# Patient Record
Sex: MALE | Race: AMERICAN INDIAN OR ALASKA NATIVE
[De-identification: names, ages, dates, MRNs, and addresses within clinical notes are randomized per-mention and may not be internally consistent; named-entity substitution may affect disease eponyms.]

---

## 2017-12-10 ENCOUNTER — HOSPITAL ENCOUNTER (INPATIENT)
Dept: HOSPITAL 5 - ED | Age: 77
LOS: 8 days | Discharge: ANOTHER HOSPITAL | DRG: 291 | End: 2017-12-18
Attending: INTERNAL MEDICINE | Admitting: INTERNAL MEDICINE
Payer: MEDICARE

## 2017-12-10 DIAGNOSIS — Z72.89: ICD-10-CM

## 2017-12-10 DIAGNOSIS — F17.200: ICD-10-CM

## 2017-12-10 DIAGNOSIS — I44.7: ICD-10-CM

## 2017-12-10 DIAGNOSIS — Z82.49: ICD-10-CM

## 2017-12-10 DIAGNOSIS — I25.10: ICD-10-CM

## 2017-12-10 DIAGNOSIS — R60.0: ICD-10-CM

## 2017-12-10 DIAGNOSIS — I42.9: ICD-10-CM

## 2017-12-10 DIAGNOSIS — N17.0: ICD-10-CM

## 2017-12-10 DIAGNOSIS — I95.2: ICD-10-CM
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
## 2017-12-10 DIAGNOSIS — M19.90: ICD-10-CM

## 2017-12-10 DIAGNOSIS — I11.0: Primary | ICD-10-CM

## 2017-12-10 DIAGNOSIS — Z23: ICD-10-CM
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
## 2017-12-10 DIAGNOSIS — T44.5X5A: ICD-10-CM

## 2017-12-10 DIAGNOSIS — I47.1: ICD-10-CM

## 2017-12-10 DIAGNOSIS — S81.809A: ICD-10-CM
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
## 2017-12-10 DIAGNOSIS — I50.43: ICD-10-CM
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
## 2017-12-10 DIAGNOSIS — E87.5: ICD-10-CM

## 2017-12-10 LAB
ALBUMIN SERPL-MCNC: 3.1 G/DL (ref 3.9–5)
ALBUMIN/GLOB SERPL: 0.8 %
ALP SERPL-CCNC: 64 UNITS/L (ref 35–129)
ALT SERPL-CCNC: 24 UNITS/L (ref 7–56)
ANION GAP SERPL CALC-SCNC: 22 MMOL/L
APTT BLD: 32 SEC. (ref 24.2–36.6)
BASOPHILS NFR BLD AUTO: 0.7 % (ref 0–1.8)
BILIRUB DIRECT SERPL-MCNC: 0.4 MG/DL (ref 0–0.2)
BILIRUB INDIRECT SERPL-MCNC: 1.3 MG/DL
BILIRUB SERPL-MCNC: 1.7 MG/DL (ref 0.1–1.2)
BUN SERPL-MCNC: 26 MG/DL (ref 9–20)
BUN/CREAT SERPL: 20 %
CALCIUM SERPL-MCNC: 9.3 MG/DL (ref 8.4–10.2)
CHLORIDE SERPL-SCNC: 103.8 MMOL/L (ref 98–107)
CHOLEST SERPL-MCNC: 154 MG/DL (ref 50–199)
CO2 SERPL-SCNC: 21 MMOL/L (ref 22–30)
EOSINOPHIL NFR BLD AUTO: 0.1 % (ref 0–4.3)
GLUCOSE SERPL-MCNC: 114 MG/DL (ref 75–100)
HCT VFR BLD CALC: 42.2 % (ref 35.5–45.6)
HDLC SERPL-MCNC: 49 MG/DL (ref 40–59)
HGB BLD-MCNC: 14.2 GM/DL (ref 11.8–15.2)
INR PPP: 1.3 (ref 0.87–1.13)
MAGNESIUM SERPL-MCNC: 2.1 MG/DL (ref 1.7–2.3)
MCH RBC QN AUTO: 34 PG (ref 28–32)
MCHC RBC AUTO-ENTMCNC: 34 % (ref 32–34)
MCV RBC AUTO: 100 FL (ref 84–94)
PLATELET # BLD: 150 K/MM3 (ref 140–440)
POTASSIUM SERPL-SCNC: 5.1 MMOL/L (ref 3.6–5)
PROT SERPL-MCNC: 7 G/DL (ref 6.3–8.2)
RBC # BLD AUTO: 4.23 M/MM3 (ref 3.65–5.03)
SODIUM SERPL-SCNC: 142 MMOL/L (ref 137–145)
TRIGL SERPL-MCNC: 91 MG/DL (ref 2–149)
WBC # BLD AUTO: 8.9 K/MM3 (ref 4.5–11)

## 2017-12-10 PROCEDURE — 84484 ASSAY OF TROPONIN QUANT: CPT

## 2017-12-10 PROCEDURE — 85610 PROTHROMBIN TIME: CPT

## 2017-12-10 PROCEDURE — 86850 RBC ANTIBODY SCREEN: CPT

## 2017-12-10 PROCEDURE — 94760 N-INVAS EAR/PLS OXIMETRY 1: CPT

## 2017-12-10 PROCEDURE — G0008 ADMIN INFLUENZA VIRUS VAC: HCPCS

## 2017-12-10 PROCEDURE — 85018 HEMOGLOBIN: CPT

## 2017-12-10 PROCEDURE — 86901 BLOOD TYPING SEROLOGIC RH(D): CPT

## 2017-12-10 PROCEDURE — 80074 ACUTE HEPATITIS PANEL: CPT

## 2017-12-10 PROCEDURE — 90686 IIV4 VACC NO PRSV 0.5 ML IM: CPT

## 2017-12-10 PROCEDURE — 85730 THROMBOPLASTIN TIME PARTIAL: CPT

## 2017-12-10 PROCEDURE — 99285 EMERGENCY DEPT VISIT HI MDM: CPT

## 2017-12-10 PROCEDURE — 83735 ASSAY OF MAGNESIUM: CPT

## 2017-12-10 PROCEDURE — 96375 TX/PRO/DX INJ NEW DRUG ADDON: CPT

## 2017-12-10 PROCEDURE — 82553 CREATINE MB FRACTION: CPT

## 2017-12-10 PROCEDURE — A9502 TC99M TETROFOSMIN: HCPCS

## 2017-12-10 PROCEDURE — 93010 ELECTROCARDIOGRAM REPORT: CPT

## 2017-12-10 PROCEDURE — 80048 BASIC METABOLIC PNL TOTAL CA: CPT

## 2017-12-10 PROCEDURE — 93005 ELECTROCARDIOGRAM TRACING: CPT

## 2017-12-10 PROCEDURE — 93970 EXTREMITY STUDY: CPT

## 2017-12-10 PROCEDURE — 71020: CPT

## 2017-12-10 PROCEDURE — 85025 COMPLETE CBC W/AUTO DIFF WBC: CPT

## 2017-12-10 PROCEDURE — 90471 IMMUNIZATION ADMIN: CPT

## 2017-12-10 PROCEDURE — 93306 TTE W/DOPPLER COMPLETE: CPT

## 2017-12-10 PROCEDURE — 36415 COLL VENOUS BLD VENIPUNCTURE: CPT

## 2017-12-10 PROCEDURE — 81001 URINALYSIS AUTO W/SCOPE: CPT

## 2017-12-10 PROCEDURE — 85027 COMPLETE CBC AUTOMATED: CPT

## 2017-12-10 PROCEDURE — 83880 ASSAY OF NATRIURETIC PEPTIDE: CPT

## 2017-12-10 PROCEDURE — G0009 ADMIN PNEUMOCOCCAL VACCINE: HCPCS

## 2017-12-10 PROCEDURE — 82962 GLUCOSE BLOOD TEST: CPT

## 2017-12-10 PROCEDURE — 85014 HEMATOCRIT: CPT

## 2017-12-10 PROCEDURE — 85520 HEPARIN ASSAY: CPT

## 2017-12-10 PROCEDURE — 82550 ASSAY OF CK (CPK): CPT

## 2017-12-10 PROCEDURE — 93017 CV STRESS TEST TRACING ONLY: CPT

## 2017-12-10 PROCEDURE — 96374 THER/PROPH/DIAG INJ IV PUSH: CPT

## 2017-12-10 PROCEDURE — 78452 HT MUSCLE IMAGE SPECT MULT: CPT

## 2017-12-10 PROCEDURE — 80061 LIPID PANEL: CPT

## 2017-12-10 PROCEDURE — 85049 AUTOMATED PLATELET COUNT: CPT

## 2017-12-10 PROCEDURE — 90732 PPSV23 VACC 2 YRS+ SUBQ/IM: CPT

## 2017-12-10 PROCEDURE — 86900 BLOOD TYPING SEROLOGIC ABO: CPT

## 2017-12-10 NOTE — EMERGENCY DEPARTMENT REPORT
ED Shortness of Breath HPI





- General


Chief Complaint: Dyspnea/Respdistress


Stated Complaint: CP/SOB


Time Seen by Provider: 12/10/17 21:21


Source: patient


Mode of arrival: Wheelchair


Limitations: Physical Limitation





- History of Present Illness


Initial Comments: 


Jannet is a 77-year-old male presents to the emergency room with chest pain or 

shortness of breath 2 months.  Patient also states also had bilateral lower 

extremity edema for one week.  Patient denies fever or chills or any other 

pain.  Patient denies past medical history.  Patient states he is not on any 

medications.  patient denies calf pain. 





MD Complaint: shortness of breath, chest pain


-: Gradual (over 2 months)


Pain Scale: 5


Quality: dull, aching


Consistency: constant


Improves With: rest


Worsens With: lying flat, exertion


Associated Symptoms: chest pain, orhopnia


Treatments Prior to Arrival: none





- Related Data


Home Oxygen Therapy: No


 Allergies











Allergy/AdvReac Type Severity Reaction Status Date / Time


 


No Known Allergies Allergy   Verified 12/10/17 22:34














ED Review of Systems


ROS: 


Stated complaint: CP/SOB


Other details as noted in HPI





Comment: All other systems reviewed and negative


Constitutional: weakness


Respiratory: orthopnea, shortness of breath, SOB with exertion, SOB at rest


Cardiovascular: chest pain, dyspnea on exertion


Endocrine: no symptoms reported


Gastrointestinal: as per HPI


Skin: other (bilateral lower extremity edema)





ED Past Medical Hx





- Past Medical History


Previous Medical History?: Yes


Hx Arthritis: Yes





- Surgical History


Past Surgical History?: No





- Family History


Family history: hypertension





- Social History


Smoking Status: Former Smoker


Substance Use Type: Alcohol





ED Physical Exam





- General


Limitations: Physical Limitation


General appearance: alert, in no apparent distress





- Head


Head exam: Present: atraumatic, normocephalic





- Eye


Eye exam: Present: normal appearance





- ENT


ENT exam: Present: mucous membranes moist





- Neck


Neck exam: Present: normal inspection





- Respiratory


Respiratory exam: Present: normal lung sounds bilaterally.  Absent: respiratory 

distress





- Cardiovascular


Cardiovascular Exam: Present: regular rate, normal rhythm.  Absent: systolic 

murmur, diastolic murmur, rubs, gallop





- GI/Abdominal


GI/Abdominal exam: Present: soft, normal bowel sounds





- Rectal


Rectal exam: Present: deferred





- Extremities Exam


Extremities exam: Present: other (bilateral lower extremity swelling noted 

above the knee.  No calf tenderness noticed)





- Back Exam


Back exam: Present: normal inspection





- Neurological Exam


Neurological exam: Present: alert, oriented X3





- Psychiatric


Psychiatric exam: Present: normal affect, normal mood





- Skin


Skin exam: Present: warm, dry, intact, normal color.  Absent: rash





ED Course


 Vital Signs











  12/10/17





  12:01


 


Temperature 98.3 F


 


Pulse Rate 114 H


 


Respiratory 24





Rate 


 


Blood Pressure 117/87


 


O2 Sat by Pulse 95





Oximetry 














ED Medical Decision Making





- Lab Data


Result diagrams: 


 12/10/17 12:21





 12/10/17 12:21





- EKG Data


-: EKG Interpreted by Me


EKG shows normal: sinus rhythm


Rate: tachycardia





- EKG Data


When compared to previous EKG there are: previous EKG unavailable


Interpretation: no acute changes, other (left bundle branch block noted)





- Radiology Data


Radiology results: report reviewed


Findings consistent with CHF








- Medical Decision Making


 Hospitalist consulted for admission.  Discussed case with hospitalist. 

Hospitalist to admit.  New-onset CHF.  Cardiology, Dr. Carvajal consulted.  

Discussed case with Dr. Carvajal.  Dr. Carvajal is admit patient for CHF exacerbation.  

lasix to be given. 








- Differential Diagnosis


cp. sob. new chf


Critical care attestation.: 


If time is entered above; I have spent that time in minutes in the direct care 

of this critically ill patient, excluding procedure time.








ED Disposition


Clinical Impression: 


 Chest pain, Shortness of breath, CHF exacerbation, Bilateral lower extremity 

edema, Hyperkalemia





Disposition: DC-09 OP ADMIT IP TO THIS HOSP


Is pt being admited?: Yes


Does the pt Need Aspirin: Yes


Condition: Critical


Time of Disposition: 22:10

## 2017-12-10 NOTE — XRAY REPORT
ROUTINE CHEST, TWO VIEWS:



HISTORY:  chest pain.



Moderate to severe cardiomegaly, mild vascular congestion and small 

bilateral pleural effusions are identified. Focal atelectasis or 

scarring in the right perihilar region is suspected. No convincing 

pneumonia. No pneumothorax.



IMPRESSION:

CHF.

## 2017-12-11 LAB
APTT BLD: 33.8 SEC. (ref 24.2–36.6)
BILIRUB UR QL STRIP: (no result)
BLOOD UR QL VISUAL: (no result)
CK SERPL-CCNC: 174 UNITS/L (ref 55–170)
CK SERPL-CCNC: 188 UNITS/L (ref 55–170)
HCT VFR BLD CALC: 42.5 % (ref 35.5–45.6)
HGB BLD-MCNC: 13.6 GM/DL (ref 11.8–15.2)
INR PPP: 1.27 (ref 0.87–1.13)
KETONES UR STRIP-MCNC: (no result) MG/DL
LEUKOCYTE ESTERASE UR QL STRIP: (no result)
MUCOUS THREADS #/AREA URNS HPF: (no result) /HPF
NITRITE UR QL STRIP: (no result)
PH UR STRIP: 5 [PH] (ref 5–7)
PLATELET # BLD: 139 K/MM3 (ref 140–440)
PROT UR STRIP-MCNC: (no result) MG/DL
RBC #/AREA URNS HPF: < 1 /HPF (ref 0–6)
UROBILINOGEN UR-MCNC: < 2 MG/DL (ref ?–2)
WBC #/AREA URNS HPF: 1 /HPF (ref 0–6)

## 2017-12-11 PROCEDURE — 3E0234Z INTRODUCTION OF SERUM, TOXOID AND VACCINE INTO MUSCLE, PERCUTANEOUS APPROACH: ICD-10-PCS | Performed by: INTERNAL MEDICINE

## 2017-12-11 RX ADMIN — HEPARIN SODIUM SCH MLS/HR: 5000 INJECTION, SOLUTION INTRAVENOUS at 14:05

## 2017-12-11 RX ADMIN — NITROGLYCERIN SCH INCH: 20 OINTMENT TOPICAL at 09:59

## 2017-12-11 RX ADMIN — NITROGLYCERIN SCH INCH: 20 OINTMENT TOPICAL at 05:18

## 2017-12-11 RX ADMIN — NITROGLYCERIN SCH: 20 OINTMENT TOPICAL at 14:18

## 2017-12-11 RX ADMIN — CARVEDILOL SCH: 6.25 TABLET, FILM COATED ORAL at 21:50

## 2017-12-11 RX ADMIN — HEPARIN SODIUM SCH MLS/HR: 5000 INJECTION, SOLUTION INTRAVENOUS at 05:30

## 2017-12-11 RX ADMIN — FUROSEMIDE SCH MG: 10 INJECTION, SOLUTION INTRAVENOUS at 09:58

## 2017-12-11 RX ADMIN — ASPIRIN SCH MG: 81 TABLET, CHEWABLE ORAL at 09:58

## 2017-12-11 RX ADMIN — NITROGLYCERIN SCH: 20 OINTMENT TOPICAL at 17:24

## 2017-12-11 RX ADMIN — CARVEDILOL SCH MG: 6.25 TABLET, FILM COATED ORAL at 09:59

## 2017-12-11 RX ADMIN — NITROGLYCERIN SCH: 20 OINTMENT TOPICAL at 10:08

## 2017-12-11 RX ADMIN — NITROGLYCERIN SCH: 20 OINTMENT TOPICAL at 12:11

## 2017-12-11 NOTE — HISTORY AND PHYSICAL REPORT
CHIEF COMPLAINT:  Shortness of breath.



OTHER COMPLAINT:  Increased swelling of the lower extremities.



HISTORY OF PRESENTING ILLNESS:  The patient is a 77-year-old male who says he

has been having shortness of breath, going on and off for about two months and

has noted swelling in both lower extremities, going on for about one week and

becoming progressively big in size.  The patient also complained of some chest

discomfort and cough.  There is no history of fever or chills and no history of

nausea or vomiting.  The patient also denied history of pain in the lower

extremities.



PAST MEDICAL HISTORY:  Pertinent for high blood pressure.  Also, the patient has

past history of nephritis.



PAST SURGICAL HISTORY:  Unremarkable.



FAMILY HISTORY:  Pertinent for hypertension.



SOCIAL HISTORY:  The patient drinks alcohol.  He used to smoke before, but does

not smoke any more and does not use illicit drug.



MEDICATIONS:  The patient is not on any home medications.



ALLERGIES:  There are no known drug allergies.



REVIEW OF SYSTEMS:

CONSTITUTIONAL:  There is no fever, no chills, no diaphoresis.

HEENT:  There is no headache or sore throat.

CARDIOVASCULAR SYSTEM:  Chest discomfort present.  No orthopnea.

RESPIRATORY SYSTEM:  Shortness of breath present.  Cough present.

GASTROINTESTINAL SYSTEM:  There is no nausea, no vomiting; no abdominal pain,

diarrhea or constipation.

NEUROLOGICAL SYSTEM:  There is no numbness, no dizziness, no altered mental

status.

MUSCULOSKELETAL SYSTEM:  There is swelling of both lower extremities and no

joint pain.

DERMATOLOGICAL SYSTEM:  There is no skin rash or itching.

GENITOURINARY SYSTEM:  There are no dysuria, hematuria or flank pain.

Rest of system review is normal.



PHYSICAL EXAMINATION:

GENERAL:  At the time of exam, the patient was found to be alert, oriented x 3

and in mild distress due to shortness of breath and swelling in the lower

extremities.

VITAL SIGNS:  Shows normal temperature with pulse of 100, respirations around

18-25, blood pressure 112/65 with O2 sat of about 86% before oxygen

administration and up to 100% with oxygen by nasal cannula.

HEENT:  Showed pupils to be equal, round, and reactive to light and

accommodation.  Extraocular muscles are intact.

NECK:  Supple with JVD and no carotid bruit.

CARDIOVASCULAR SYSTEM:  Showed normal first and second heart sounds with no

gallops or murmurs.

RESPIRATORY SYSTEM:  Showed reduced air entry on both sides of the lung with

bibasilar rales.

GASTROINTESTINAL SYSTEM:  Show abdomen to be full, soft, nontender with no

organomegaly or rigidity.

NEUROLOGIC:  Shows no focal deficit.

MUSCULOSKELETAL SYSTEM:  Shows swelling of the lower extremities from the foot

up to the thigh level, which is pitting in nature.

DERMATOLOGICAL SYSTEM:  Show no skin rash.

GENITOURINARY SYSTEM:  Show no costovertebral angle tenderness.



PERTINENT LABORATORY AND IMAGING STUDIES:  The patient has CBC done that shows

normal white count, normal hemoglobin and normal hematocrit with elevated MCV of

100.  CBC differential showing elevated segmented neutrophil of 80.4. 

Coagulation studies show slight increase in PT of 16.9 and slight increase in

INR of 1.30.  Chemistry shows a slightly elevated potassium level of 5.1 with

high BUN of 26 and slightly low GFR of 54.  The patient's total bilirubin level

was high with a value of 1.7 and direct bilirubin is slightly elevated with a

value of 0.4.  The patient's total creatinine kinase is high with a value of 188

with elevated CK-MB of 8.0 and an elevated percentage index of 4.0.  The

patient's troponin level is high with a value of 0.030 and the second value with

a value of 0.031 and the last one of 0.035.  The patient's brain natriuretic

peptide level is high with a value of 31,233 and albumin level shows slight

decreased value of 3.1.  Urinalysis came back unremarkable.



IMAGING STUDIES:  The patient had a chest x-ray done that shows CHF.



DIAGNOSES:

1.  Congestive heart failure.

2.  Non-ST segment elevation myocardial infarction.



PLAN:  The patient will be admitted to medical floor on telemetry and will have

echocardiogram done in the morning.  The patient will have Cardiology consult

with Dr. Rodriguez in view of the elevated troponin levels and will be on aspirin

81 mg by mouth daily and will be on carvedilol 6.25 mg by mouth twice daily. 

The patient will be on Lasix 40 mg IV daily and will be on heparin 5000 units

subcutaneously every 12 hours for DVT prophylaxis.  The patient will be on

lisinopril 5 mg by mouth daily and morphine 2 mg IV every 5 minutes as needed

for pain and nitro paste half inch to anterior chest wall q.i.d.  The patient

will be on oxygen by nasal cannula 2 liter per minute, and in view of the

persistent elevated troponin level, the patient will be started on IV heparin

rather than subcutaneous heparin and will be n.p.o. for possible intervention by

the Cardiology.





DD: 12/11/2017 02:48

DT: 12/11/2017 04:22

JOB# [id number]  [id number]

OCN/NTS

## 2017-12-11 NOTE — CONSULTATION
History of Present Illness


Consult date: 12/11/17





Medications and Allergies


 Allergies











Allergy/AdvReac Type Severity Reaction Status Date / Time


 


No Known Allergies Allergy   Verified 12/10/17 22:34











 Home Medications











 Medication  Instructions  Recorded  Confirmed  Last Taken  Type


 


No Known Home Medications [No  12/11/17 12/11/17 Unknown History





Reported Home Medications]     











Active Meds: 


Active Medications





Aspirin (Baby Aspirin)  81 mg PO QDAY [facility]


   Last Admin: 12/11/17 09:58 Dose:  81 mg


Carvedilol (Coreg)  6.25 mg PO BID [facility]


   Last Admin: 12/11/17 09:59 Dose:  6.25 mg


Furosemide (Lasix)  40 mg IV QDAY [facility]


   Last Admin: 12/11/17 09:58 Dose:  40 mg


Heparin Sodium/Sodium Chloride (Heparin/ 0.45% Nacl-25,000 Unit/500 Ml)  25,000 

unit in 500 mls @ 20 mls/hr IV TITRATE [facility]; 1,000 UNITS/HR


   PRN Reason: Protocol


   Last Admin: 12/11/17 05:30 Dose:  1,000 units/hr, 20 mls/hr


Influenza Virus Vaccine Quadrival (Fluarix Quad [id number](36 Mos+)  0.5 ml IM 

.ONCE ONE


   Stop: 12/11/17 12:01


Lisinopril (Zestril)  5 mg PO QDAY [facility]


   Last Admin: 12/11/17 09:59 Dose:  5 mg


Morphine Sulfate (Morphine)  2 mg IV Q5MIN PRN


   PRN Reason: Chest Pain


   Last Admin: 12/10/17 23:04 Dose:  2 mg


Nitroglycerin (Nitrostat)  0.4 mg SL .Q5MIN PRN


   PRN Reason: Chest Pain


Nitroglycerin (Nitro-Bid 2%)  0.5 inch TP QIDNTG [facility]


   PRN Reason: Protocol


   Last Admin: 12/11/17 10:08 Dose:  Not Given











Physical Examination


 Vital Signs











Temp Pulse Resp BP Pulse Ox


 


 98.3 F   114 H  24   117/87   95 


 


 12/10/17 12:01  12/10/17 12:01  12/10/17 12:01  12/10/17 12:01  12/10/17 12:01














Results





 12/11/17 04:54





 12/10/17 12:21


 Cardiac Enzymes











  12/10/17 12/11/17 12/11/17 Range/Units





  12:21 01:11 04:54 


 


AST  27    (5-40)  units/L


 


CK-MB (CK-2)   8.0 H  7.9 H  (0.0-4.0)  ng/mL








 Coagulation











  12/10/17 12/11/17 Range/Units





  12:21 04:54 


 


PT  16.9 H  16.6 H  (12.2-14.9)  Sec.


 


INR  1.30 H  1.27 H  (0.87-1.13)  


 


APTT  32.0  33.8  (24.2-36.6)  Sec.








 Lipids











  12/10/17 Range/Units





  12:21 


 


Triglycerides  91  (2-149)  mg/dL


 


Cholesterol  154  ()  mg/dL


 


HDL Cholesterol  49  (40-59)  mg/dL


 


Cholesterol/HDL Ratio  3.14  %








 CBC











  12/10/17 12/11/17 Range/Units





  12:21 04:54 


 


WBC  8.9   (4.5-11.0)  K/mm3


 


RBC  4.23   (3.65-5.03)  M/mm3


 


Hgb  14.2  13.6  (11.8-15.2)  gm/dl


 


Hct  42.2  42.5  (35.5-45.6)  %


 


Plt Count  150  139 L  (140-440)  K/mm3


 


Lymph #  0.9 L   (1.2-5.4)  K/mm3


 


Mono #  0.7   (0.0-0.8)  K/mm3


 


Eos #  0.0   (0.0-0.4)  K/mm3


 


Baso #  0.1   (0.0-0.1)  K/mm3








 Comprehensive Metabolic Panel











  12/10/17 Range/Units





  12:21 


 


Sodium  142  (137-145)  mmol/L


 


Potassium  5.1 H  (3.6-5.0)  mmol/L


 


Chloride  103.8  ()  mmol/L


 


Carbon Dioxide  21 L  (22-30)  mmol/L


 


BUN  26 H  (9-20)  mg/dL


 


Creatinine  1.3  (0.8-1.5)  mg/dL


 


Glucose  114 H  ()  mg/dL


 


Calcium  9.3  (8.4-10.2)  mg/dL


 


Direct Bilirubin  0.4 H  (0-0.2)  mg/dL


 


Indirect Bilirubin  1.3  mg/dL


 


AST  27  (5-40)  units/L


 


ALT  24  (7-56)  units/L


 


Alkaline Phosphatase  64  ()  units/L


 


Total Protein  7.0  (6.3-8.2)  g/dL


 


Albumin  3.1 L  (3.9-5)  g/dL














Assessment and Plan





Detailed Cardiology consult done.

## 2017-12-11 NOTE — PROGRESS NOTE
Assessment and Plan








- Chest pain


 Monitor serial Francisco. continue with oxygen, NTG, ASA, morphine. Cardiology 

consulted





- Systolic heart failure


ECHO done. EF 5-10% wiht severe systolic dysfunction. 


Strict Is and Os, Daily weight, Limited fluid intake to 1L/day


ACEI, BB, Statin, 





- KASSIE


Elevated BUN 26. Likely secondry to diuresis. Trend BUN and CR





- Hyperkalemia


anticipate decreased with diuresis. Trend





- Abnormal Francisco


on oxygen NTG, ASA,  BB, Cardilogy following





- DVT PPx with Heparinfg and GI with pepcid











Subjective


Date of service: 12/11/17


Principal diagnosis: chest pain


Interval history: 








Chest pain getting better. Still has shortness of breath. Swelling of both feet 

improving








Objective





- Constitutional


Vitals: 


 Vital Signs - 12hr











  12/11/17 12/11/17





  12:12 13:19


 


Pulse Rate 81 


 


O2 Sat by Pulse  97





Oximetry  











General appearance: Present: no acute distress





- EENT


Eyes: PERRL, EOM intact


ENT: hearing intact, clear oral mucosa





- Neck


Neck: supple, normal ROM





- Respiratory


Respiratory effort: normal


Respiratory: bilateral: diminished





- Breasts


Breasts: normal





- Cardiovascular


Rhythm: regular


Heart Sounds: Present: S1 & S2.  Absent: gallop, rub


Extremities: pulses intact, normal color, Full ROM


Extremity abnormal: edema (3+ tyesha LE)





- Gastrointestinal


General gastrointestinal: Present: soft, non-tender, non-distended, normal 

bowel sounds





- Genitourinary


Male genitourinary: normal





- Integumentary


Integumentary: clear, warm, dry





- Musculoskeletal


Musculoskeletal: 1, strength equal bilaterally





- Neurologic


Neurologic: moves all extremities





- Psychiatric


Psychiatric: memory intact, appropriate mood/affect, intact judgment & insight





- Labs


CBC & Chem 7: 


 12/11/17 04:54





 12/10/17 12:21


Labs: 


 Abnormal lab results











  12/11/17 12/11/17 12/11/17 Range/Units





  01:11 04:54 04:54 


 


Plt Count    139 L  (140-440)  K/mm3


 


PT     (12.2-14.9)  Sec.


 


INR     (0.87-1.13)  


 


Heparin Anti-Xa Level     (0.3-0.7)  U.I./ml


 


Total Creatine Kinase  188 H  174 H   ()  units/L


 


CK-MB (CK-2)  8.0 H  7.9 H   (0.0-4.0)  ng/mL


 


CK-MB (CK-2) Rel Index  4.2 H  4.5 H   (0-4)  


 


Troponin T  0.035 H    (0.00-0.029)  ng/mL














  12/11/17 12/11/17 Range/Units





  04:54 12:09 


 


Plt Count    (140-440)  K/mm3


 


PT  16.6 H   (12.2-14.9)  Sec.


 


INR  1.27 H   (0.87-1.13)  


 


Heparin Anti-Xa Level   0.26 L  (0.3-0.7)  U.I./ml


 


Total Creatine Kinase    ()  units/L


 


CK-MB (CK-2)    (0.0-4.0)  ng/mL


 


CK-MB (CK-2) Rel Index    (0-4)  


 


Troponin T    (0.00-0.029)  ng/mL

## 2017-12-12 LAB
ANION GAP SERPL CALC-SCNC: 59 MMOL/L
BUN SERPL-MCNC: 30 MG/DL (ref 9–20)
BUN/CREAT SERPL: 20 %
CALCIUM SERPL-MCNC: 8 MG/DL (ref 8.4–10.2)
CHLORIDE SERPL-SCNC: 60 MMOL/L (ref 98–107)
CO2 SERPL-SCNC: 29 MMOL/L (ref 22–30)
GLUCOSE SERPL-MCNC: 84 MG/DL (ref 75–100)
POTASSIUM SERPL-SCNC: 4.1 MMOL/L (ref 3.6–5)
SODIUM SERPL-SCNC: 144 MMOL/L (ref 137–145)

## 2017-12-12 RX ADMIN — HEPARIN SODIUM SCH UNIT: 5000 INJECTION, SOLUTION INTRAVENOUS; SUBCUTANEOUS at 21:50

## 2017-12-12 RX ADMIN — ASPIRIN SCH MG: 81 TABLET, CHEWABLE ORAL at 11:53

## 2017-12-12 RX ADMIN — CARVEDILOL SCH: 6.25 TABLET, FILM COATED ORAL at 17:18

## 2017-12-12 RX ADMIN — NITROGLYCERIN SCH: 20 OINTMENT TOPICAL at 05:24

## 2017-12-12 RX ADMIN — NITROGLYCERIN SCH: 20 OINTMENT TOPICAL at 17:19

## 2017-12-12 RX ADMIN — HEPARIN SODIUM SCH MLS/HR: 5000 INJECTION, SOLUTION INTRAVENOUS at 06:03

## 2017-12-12 RX ADMIN — FUROSEMIDE SCH: 10 INJECTION, SOLUTION INTRAVENOUS at 21:59

## 2017-12-12 RX ADMIN — NITROGLYCERIN SCH: 20 OINTMENT TOPICAL at 18:53

## 2017-12-12 RX ADMIN — CARVEDILOL SCH: 6.25 TABLET, FILM COATED ORAL at 21:49

## 2017-12-12 RX ADMIN — LISINOPRIL SCH: 5 TABLET ORAL at 17:19

## 2017-12-12 NOTE — CONSULTATION
REFERRING PHYSICIAN:  Arvind Morgan MD, hospitalist.



HISTORY OF PRESENT ILLNESS:  A 77-year-old pleasant -American gentleman

with history of longstanding hypertension, degenerative joint disease, was

admitted with progressive shortness of breath for the past 1 month duration and

also moderate-to-severe substernal pressure-like chest pain on and off, at times

addressed, and also on exertion, radiating to both sides of the neck.  It is not

associated with any nausea, vomiting, sweating, palpitations, presyncope or

syncope.  He also gives a history of swelling of both the legs and feet for the

past 1 month.



He has not seen a physician for a long time.  No history of diabetes mellitus or

hyperlipidemia.  His serum creatinine is 1.3.  His troponins are minimally

increased with the CPK of 188 and 174, with MB of 8 and indices of 4.2 and 4.5. 

The mild increase in troponin is most likely secondary to congestive heart

failure.  He has improved clinically with treatment.  Monitor also revealed an 
episode

of nonsustained wide QRS tachycardia, most likely ventricular tachycardia

(rate of 150 beats per minute).  His EKG revealed complete left bundle branch

block.  He is being treated with intravenous diuretics with improvement.



PAST MEDICAL HISTORY:  History of multiple medical problems as described above. 

No history of CAD or myocardial infarction in the past, and history of chronic

shortness of breath.



SOCIAL HISTORY:  He was a smoker, more than 10 years ago.  He smoked for about 2

years (a pack a day and quit smoking then.)  No history of alcoholic or drug

abuse.



FAMILY HISTORY:  Positive for hypertension.  Negative for premature coronary

artery disease.



ALLERGIES:  None known.



MEDICATIONS:  He received intravenous morphine, nitro paste in the emergency

room, and he also received aspirin.  He is on aspirin 325 mg.  He is on baby

aspirin 81 mg p.o. daily, Coreg 6.25 mg p.o. b.i.d., furosemide 40 mg IV daily,

lisinopril 5 mg p.o. daily, nitro paste half an inch to the chest wall.



REVIEW OF SYSTEMS:

CARDIOVASCULAR SYSTEM:  As described in the history.

BONE AND JOINTS:  As described in the history.

GI:  Total bilirubin during this admission has increased to 1.7.

Review of rest of the 10 systems is negative.



PHYSICAL EXAMINATION:

GENERAL:  A 77-year-old pleasant -American gentleman.

VITAL SIGNS:  He is afebrile, pulse is 90 per minute regular, respirations 22

per minute, blood pressure 106/74 mmHg.

NEUROLOGIC:  He is alert and oriented x 3.

HEENT:  Negative.

NECK:  Supple.  JVD 1-2+.  No carotid bruit.

HEART:  PMI shifted down and out and is heaving in nature, no palpable thrills. 

Auscultation of the heart reveals a grade 2-3/6 ejection systolic murmur over

the precardium, not contacted to the carotids more prominently over the apex. 

No rub.  S4 is present, no S3.  No rub.

EXTREMITIES:  Peripheral pulses felt.  Bilateral 1-2+ pitting edema of both legs

and feet.

LUNGS:  Examination also revealed mild wheezing.  No bronchial breathing.

ABDOMEN:  Soft, benign.  No organomegaly.

SKIN:  Negative.

BONE AND JOINTS:  Negative.



LABORATORY DATA:  Troponin 0.03, 0.031, 0.035, 0.028; HDL is 49, LDL is 87,

triglycerides 91, potassium 5.1, BUN and creatinine 26 and 1.3, sodium 142. 

Chest x-ray reveals congestive heart failure.  EKG is sinus tachycardia with

rate of 119 per minute.  Complete left bundle branch block with secondary ST-T

changes.  His proBNP is markedly increased to 31,233.



IMPRESSION:

1.  Recurrent chest pains with mild increase in troponins (there is mild

increase in troponins could be secondary to congestive heart failure and also

chronic kidney disease, acute myocardial infarction is unlikely).

2.  Acute on chronic congestive heart failure (whether it is systolic or

diastolic or a combination of both will be known after reviewing the

echocardiogram).

3.  History of hypertension.

4.  History of degenerative joint disease.

5.  Mild hyperkalemia.

6.  Possible chronic kidney disease, creatinine 1.3.

7.  Complete left bundle branch block and EKG.



RECOMMENDATIONS:

1.  We will hold lisinopril in view of mild hyperkalemia.

2.  Salt and fluid restriction.

3.  To continue other medications.

4.  We will follow up echocardiogram (pending at this time).

5.  We will schedule him for a Haley stress nuclear scan in the a.m., and need to

be followed up.



Prognosis is guarded.  We will follow.





DD: 12/11/2017 10:25

DT: 12/12/2017 01:05

JOB# [id number]  [id number]

[facility]

## 2017-12-12 NOTE — PROGRESS NOTE
Assessment and Plan


Assessment and plan: 


Patient is a 77 year old male with long standing hx of HTN, DJD, admitted with 

severe shortness of breath, ongoing for 2 months and worse in the last ffew 

days with associated lower ext swelling. Occasional Alcohol per hx and current 

tobacco use. 





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


* I/O, DAILY Weight, cardiology input noted, coreg, hold Lisinopril, Discussed 

with cardiology will need Life vest due to EF of 10%


Atypical chest pain


* Stress test negative. Likely secondary to CAD, will monitor. Now resolved, 

STATIN THERAPY


 KASSIE


* Elevated BUN 26. Likely secondry to Vasomotor nephropathy from diuresis. 

Trend BUN and CR. Hold ACEI


 Hyperkalemia


* Resolved, anticipate decreased with diuresis. Trend


Non Ischemic cardiomyopathy


* cardiology following


DJD


* stable, ambulates with a cane


complete LBBB


* Cardiology following


 Abnormal Tasha


* Elevate Troponin like secondary to cardiomyopathy


DVT PPx with Heparin and GI with pepcid





Plan discussed with patient in detail .





History


Interval history: 


Patient seen and examined in no acute distress. He still with some shortness of 

breath but reports improvement in his lower ext swelling. He denies any chest 

pain, nausea, vomiting or diarrhea. 








Hospitalist Physical





- Constitutional


Vitals: 


 











Temp Pulse Resp BP Pulse Ox


 


 98.2 F   50 L  18   93/45   7 L


 


 12/12/17 07:49  12/12/17 07:49  12/12/17 07:49  12/12/17 07:49  12/12/17 08:00











General appearance: Present: no acute distress





- EENT


Eyes: Present: PERRL, EOM intact


ENT: hearing intact, clear oral mucosa





- Neck


Neck: Present: supple, normal ROM





- Respiratory


Respiratory effort: normal


Respiratory: bilateral: rhonchi





- Cardiovascular


Rhythm: regular


Heart Sounds: Present: S1 & S2





- Extremities


Extremities: no ischemia, pulses intact, pulses symmetrical


Extremity abnormal: edema (2+ pitting edema)


Peripheral Pulses: within normal limits





- Abdominal


General gastrointestinal: soft, non-tender, non-distended





- Integumentary


Integumentary: Present: clear, warm





- Psychiatric


Psychiatric: appropriate mood/affect, intact judgment & insight





- Neurologic


Neurologic: CNII-XII intact, moves all extremities





- Allied Health


Allied health notes reviewed: nursing





Results





- Labs


CBC & Chem 7: 


 12/11/17 04:54





 12/12/17 02:11


Labs: 


 Laboratory Last Values











WBC  8.9 K/mm3 (4.5-11.0)   12/10/17  12:21    


 


RBC  4.23 M/mm3 (3.65-5.03)   12/10/17  12:21    


 


Hgb  13.6 gm/dl (11.8-15.2)   12/11/17  04:54    


 


Hct  42.5 % (35.5-45.6)   12/11/17  04:54    


 


MCV  100 fl (84-94)  H  12/10/17  12:21    


 


MCH  34 pg (28-32)  H  12/10/17  12:21    


 


MCHC  34 % (32-34)   12/10/17  12:21    


 


RDW  18.0 % (13.2-15.2)  H  12/10/17  12:21    


 


Plt Count  139 K/mm3 (140-440)  L  12/11/17  04:54    


 


Lymph % (Auto)  10.4 % (13.4-35.0)  L  12/10/17  12:21    


 


Mono % (Auto)  8.4 % (0.0-7.3)  H  12/10/17  12:21    


 


Eos % (Auto)  0.1 % (0.0-4.3)   12/10/17  12:21    


 


Baso % (Auto)  0.7 % (0.0-1.8)   12/10/17  12:21    


 


Lymph #  0.9 K/mm3 (1.2-5.4)  L  12/10/17  12:21    


 


Mono #  0.7 K/mm3 (0.0-0.8)   12/10/17  12:21    


 


Eos #  0.0 K/mm3 (0.0-0.4)   12/10/17  12:21    


 


Baso #  0.1 K/mm3 (0.0-0.1)   12/10/17  12:21    


 


Seg Neutrophils %  80.4 % (40.0-70.0)  H  12/10/17  12:21    


 


Seg Neutrophils #  7.1 K/mm3 (1.8-7.7)   12/10/17  12:21    


 


PT  16.6 Sec. (12.2-14.9)  H  12/11/17  04:54    


 


INR  1.27  (0.87-1.13)  H  12/11/17  04:54    


 


APTT  33.8 Sec. (24.2-36.6)   12/11/17  04:54    


 


Heparin Anti-Xa Level  0.55 U.I./ml (0.3-0.7)   12/12/17  01:50    


 


Sodium  144 mmol/L (137-145)   12/12/17  02:11    


 


Potassium  4.1 mmol/L (3.6-5.0)   12/12/17  02:11    


 


Chloride  60.0 mmol/L ()  L  12/12/17  02:11    


 


Carbon Dioxide  29 mmol/L (22-30)  D 12/12/17  02:11    


 


Anion Gap  59 mmol/L  12/12/17  02:11    


 


BUN  30 mg/dL (9-20)  H  12/12/17  02:11    


 


Creatinine  1.5 mg/dL (0.8-1.5)   12/12/17  02:11    


 


Estimated GFR  > 60 ml/min  12/12/17  02:11    


 


BUN/Creatinine Ratio  20 %  12/12/17  02:11    


 


Glucose  84 mg/dL ()   12/12/17  02:11    


 


POC Glucose  95  ()   12/12/17  07:52    


 


Calcium  8.0 mg/dL (8.4-10.2)  L  12/12/17  02:11    


 


Magnesium  2.10 mg/dL (1.7-2.3)   12/10/17  12:21    


 


Total Bilirubin  1.70 mg/dL (0.1-1.2)  H  12/10/17  12:21    


 


Direct Bilirubin  0.4 mg/dL (0-0.2)  H  12/10/17  12:21    


 


Indirect Bilirubin  1.3 mg/dL  12/10/17  12:21    


 


AST  27 units/L (5-40)   12/10/17  12:21    


 


ALT  24 units/L (7-56)   12/10/17  12:21    


 


Alkaline Phosphatase  64 units/L ()   12/10/17  12:21    


 


Total Creatine Kinase  174 units/L ()  H  12/11/17  04:54    


 


CK-MB (CK-2)  7.9 ng/mL (0.0-4.0)  H  12/11/17  04:54    


 


CK-MB (CK-2) Rel Index  4.5  (0-4)  H  12/11/17  04:54    


 


Troponin T  0.028 ng/mL (0.00-0.029)   12/11/17  04:54    


 


NT-Pro-B Natriuret Pep  52047 pg/mL (0-900)  H  12/10/17  12:21    


 


Total Protein  7.0 g/dL (6.3-8.2)   12/10/17  12:21    


 


Albumin  3.1 g/dL (3.9-5)  L  12/10/17  12:21    


 


Albumin/Globulin Ratio  0.8 %  12/10/17  12:21    


 


Triglycerides  91 mg/dL (2-149)   12/10/17  12:21    


 


Cholesterol  154 mg/dL ()   12/10/17  12:21    


 


LDL Cholesterol Direct  87 mg/dL ()   12/10/17  12:21    


 


HDL Cholesterol  49 mg/dL (40-59)   12/10/17  12:21    


 


Cholesterol/HDL Ratio  3.14 %  12/10/17  12:21    


 


Urine Color  Straw  (Yellow)   12/11/17  00:49    


 


Urine Turbidity  Clear  (Clear)   12/11/17  00:49    


 


Urine pH  5.0  (5.0-7.0)   12/11/17  00:49    


 


Ur Specific Gravity  1.005  (1.003-1.030)   12/11/17  00:49    


 


Urine Protein  <15 mg/dl mg/dL (Negative)   12/11/17  00:49    


 


Urine Glucose (UA)  Neg mg/dL (Negative)   12/11/17  00:49    


 


Urine Ketones  Neg mg/dL (Negative)   12/11/17  00:49    


 


Urine Blood  Neg  (Negative)   12/11/17  00:49    


 


Urine Nitrite  Neg  (Negative)   12/11/17  00:49    


 


Urine Bilirubin  Neg  (Negative)   12/11/17  00:49    


 


Urine Urobilinogen  < 2.0 mg/dL (<2.0)   12/11/17  00:49    


 


Ur Leukocyte Esterase  Neg  (Negative)   12/11/17  00:49    


 


Urine WBC (Auto)  1.0 /HPF (0.0-6.0)   12/11/17  00:49    


 


Urine RBC (Auto)  < 1.0 /HPF (0.0-6.0)   12/11/17  00:49    


 


U Epithel Cells (Auto)  < 1.0 /HPF (0-13.0)   12/11/17  00:49    


 


Urine Mucus  Few /HPF  12/11/17  00:49    


 


Blood Type  A POSITIVE   12/10/17  12:21    


 


Antibody Screen  Negative   12/10/17  12:21    














- Imaging and Cardiology


Chest x-ray: image reviewed (Congestive heart failure)

## 2017-12-12 NOTE — PROGRESS NOTE
Assessment and Plan


Assessment:


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


Recurrent chest pains - currently resolved


Mild increase in troponins - trending down; currenly nonspecific in setting of 

acutely decompensated heart failure and ? CKD


CMP - presumably nonischemic as stress test today was negative for ischemia


H/o HTN - currently with borderline hypotension 


DJD


Mild hyperkalemia - improved


Possible CKD - renal indices trending upwards


Complete LBBB


NSVT





Plan:


Echo reviewed - EF 5-10%, abnormal LV diastolic function, LA moderately dilated

, RV mild to moderately dilated, RV systolic function moderately reduced, RA 

mild to moderately dilated, severe MR, mod TR, mild pulm HTN, RVSP 40mmHg. 


S/p lexiscan MPI stress test this AM which was negative for ischemia, EF 10%. D/

c heparin gtt and initiate SQ heparin for DVT prophylaxis. 


Cont Coreg. Recommend holding lisinopril in setting of increasing serum Cr and 

borderline hypotension. Repeat BMP in AM.


Telemetry reviewed with multiple episodes of NSVT. Will order LifeVest given EF 

of 5-10%.   


Possible discharge in AM and pending LifeVest placement. 





The patient has been seen in conjunction with Dr. Foley who agrees with the 

assessment and plan of care.








Subjective


Date of service: 12/12/17


Principal diagnosis: chest pain


Interval history: 


for stress test today








Objective





 Last Vital Signs











Temp  98.2 F   12/12/17 07:49


 


Pulse  88   12/12/17 09:45


 


Resp  18   12/12/17 07:49


 


BP  89/66   12/12/17 09:45


 


Pulse Ox  7 L  12/12/17 08:00








 











- Physical Examination


General: No Apparent Distress


HEENT: Positive: PERRL, Normocephaly, Sinus Tenderness


Neck: Positive: neck supple, trachea midline


Cardiac: Positive: Reg Rate and Rhythm, S1/S2, Systolic Murmur


Lungs: Positive: Wheezes


Neuro: Positive: Grossly Intact


Abdomen: Positive: Soft.  Negative: Tender


Skin: Positive: Clear.  Negative: Rash, Wound


Extremities: Present: +1 Edema (BLE)





- Labs and Meds


 Comprehensive Metabolic Panel











  12/12/17 Range/Units





  02:11 


 


Sodium  144  (137-145)  mmol/L


 


Potassium  4.1  (3.6-5.0)  mmol/L


 


Chloride  60.0 L  ()  mmol/L


 


Carbon Dioxide  29  D  (22-30)  mmol/L


 


BUN  30 H  (9-20)  mg/dL


 


Creatinine  1.5  (0.8-1.5)  mg/dL


 


Glucose  84  ()  mg/dL


 


Calcium  8.0 L  (8.4-10.2)  mg/dL














- Telemetry


EKG Rhythm: Sinus Rhythm

## 2017-12-13 LAB
ANION GAP SERPL CALC-SCNC: 13 MMOL/L
BUN SERPL-MCNC: 26 MG/DL (ref 9–20)
BUN/CREAT SERPL: 22 %
CALCIUM SERPL-MCNC: 8.6 MG/DL (ref 8.4–10.2)
CHLORIDE SERPL-SCNC: 108.3 MMOL/L (ref 98–107)
CO2 SERPL-SCNC: 29 MMOL/L (ref 22–30)
GLUCOSE SERPL-MCNC: 86 MG/DL (ref 75–100)
HCT VFR BLD CALC: 42.5 % (ref 35.5–45.6)
HGB BLD-MCNC: 13.7 GM/DL (ref 11.8–15.2)
MCH RBC QN AUTO: 32 PG (ref 28–32)
MCHC RBC AUTO-ENTMCNC: 32 % (ref 32–34)
MCV RBC AUTO: 100 FL (ref 84–94)
PLATELET # BLD: 127 K/MM3 (ref 140–440)
POTASSIUM SERPL-SCNC: 4.7 MMOL/L (ref 3.6–5)
RBC # BLD AUTO: 4.25 M/MM3 (ref 3.65–5.03)
SODIUM SERPL-SCNC: 146 MMOL/L (ref 137–145)
WBC # BLD AUTO: 5.2 K/MM3 (ref 4.5–11)

## 2017-12-13 RX ADMIN — HEPARIN SODIUM SCH UNIT: 5000 INJECTION, SOLUTION INTRAVENOUS; SUBCUTANEOUS at 05:52

## 2017-12-13 RX ADMIN — HEPARIN SODIUM SCH UNIT: 5000 INJECTION, SOLUTION INTRAVENOUS; SUBCUTANEOUS at 18:17

## 2017-12-13 RX ADMIN — NITROGLYCERIN SCH: 20 OINTMENT TOPICAL at 09:59

## 2017-12-13 RX ADMIN — NITROGLYCERIN SCH: 20 OINTMENT TOPICAL at 05:38

## 2017-12-13 RX ADMIN — NITROGLYCERIN SCH: 20 OINTMENT TOPICAL at 18:00

## 2017-12-13 RX ADMIN — LISINOPRIL SCH: 5 TABLET ORAL at 09:59

## 2017-12-13 RX ADMIN — NITROGLYCERIN SCH: 20 OINTMENT TOPICAL at 17:54

## 2017-12-13 RX ADMIN — CARVEDILOL SCH: 6.25 TABLET, FILM COATED ORAL at 23:51

## 2017-12-13 RX ADMIN — CARVEDILOL SCH: 6.25 TABLET, FILM COATED ORAL at 10:00

## 2017-12-13 RX ADMIN — HEPARIN SODIUM SCH UNIT: 5000 INJECTION, SOLUTION INTRAVENOUS; SUBCUTANEOUS at 22:31

## 2017-12-13 RX ADMIN — ASPIRIN SCH MG: 81 TABLET, CHEWABLE ORAL at 09:57

## 2017-12-13 RX ADMIN — FUROSEMIDE SCH MG: 10 INJECTION, SOLUTION INTRAVENOUS at 18:17

## 2017-12-13 RX ADMIN — FUROSEMIDE SCH MG: 10 INJECTION, SOLUTION INTRAVENOUS at 09:57

## 2017-12-13 NOTE — TREADMILL REPORT
SINGLE ISOTOPE DUAL STUDY MYOCARDIAL PERFUSION SCAN REPORT



REFERRING PHYSICIAN:  Julien Herman MD.



VIEWED BY:  Kurtis Maza MD.



DESCRIPTION OF PROCEDURE:  The patient received 10 mCi of technetium 99m Myoview

intravenously under resting conditions.  Resting myocardial perfusion scan was

done.  Subsequently, the patient underwent Lexiscan stress test as per the

protocol.  During Lexiscan stress, the patient received 28 mCi of technetium 99m

Myoview intravenously.



After 30-60 minutes, post stress images were done.  Computerized reconstruction

images were performed for analysis.



The post-stress images revealed markedly dilated left ventricle.  Large severe

inferior wall perfusion defect was seen.  A small mild septal perfusion defect

was seen.  A small mild anterior wall perfusion defect was also seen.



Gated study revealed markedly dilated left ventricle with severe global left

ventricular systolic dysfunction with LVEF around 10%.



The resting images revealed minimal reversibility in the inferior wall.  There

was no reperfusion in the septal region or in the anterior wall region.



CONCLUSIONS:

1.  Markedly dilated left ventricle.

2.  Severe global left ventricular systolic dysfunction with LVEF around 10%.

3.  Large severe minimally reversible inferior wall perfusion defect.

4.  Small fixed, mild septal perfusion defect.

5.  Small mild fixed anterior wall perfusion defect.

6.  The above findings are suggestive of dilated severe nonischemic

cardiomyopathy.





DD: 12/12/2017 13:13

DT: 12/13/2017 07:11

[facility]# [id number]  [id number]

[facility]

## 2017-12-13 NOTE — PROGRESS NOTE
Assessment and Plan


Assessment and plan: 


Patient is a 77 year old male with long standing hx of HTN, DJD, admitted with 

severe shortness of breath, ongoing for 2 months and worse in the last ffew 

days with associated lower ext swelling. Occasional Alcohol per hx and current 

tobacco use. 





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


* I/O, DAILY Weight, cardiology input noted, coreg, hold Lisinopril, Discussed 

with cardiology will need Life vest due to EF of 10%


* Stress test with markedly dilated LV with severely decreased EF of 10%. 

Multiple fixed perfusion defects with minimal reversibility in the inferior 

wall - suggestive of dilated nonischemic cardiomyopathy.


Atypical chest pain


* significantly dilated LV with noted severely decreased EF. will need a life 

vest. Likely secondary to CAD, will monitor. Now resolved, STATIN THERAPY


 KASSIE


* Elevated BUN 26. Likely secondry to Vasomotor nephropathy from diuresis. 

Trend BUN and CR. Hold ACEI


 Hyperkalemia


* Resolved, anticipate decreased with diuresis. Trend


Non Ischemic cardiomyopathy


* cardiology following


DJD


* stable, ambulates with a cane


complete LBBB


* Cardiology following


 Abnormal Tasha


* Elevate Troponin like secondary to cardiomyopathy


DVT PPx with Heparin and GI with pepcid


Plan discussed with patient in detail .





:





History


Interval history: 


Patient seen and examined in no acute distress. He still with some shortness of 

breath but reports improvement in his lower ext swelling. He denies any chest 

pain, nausea, vomiting or diarrhea. 








Hospitalist Physical





- Physical exam


Narrative exam: 


General appearance: Present: no acute distress





- EENT


Eyes: Present: PERRL, EOM intact


ENT: hearing intact, clear oral mucosa





- Neck


Neck: Present: supple, normal ROM





- Respiratory


Respiratory effort: normal


Respiratory: bilateral: rhonchi





- Cardiovascular


Rhythm: regular


Heart Sounds: Present: S1 & S2





- Extremities


Extremities: no ischemia, pulses intact, pulses symmetrical


Extremity abnormal: edema (2+ pitting edema)


Peripheral Pulses: within normal limits





- Abdominal


General gastrointestinal: soft, non-tender, non-distended





- Integumentary


Integumentary: Present: clear, warm





- Psychiatric


Psychiatric: appropriate mood/affect, intact judgment & insight





- Neurologic


Neurologic: CNII-XII intact, moves all extremities





- Allied Health


Allied health notes reviewed: nursing











- Constitutional


Vitals: 


 











Temp Pulse Resp BP Pulse Ox


 


 97.8 F   80   18   102/60   100 


 


 12/13/17 03:58  12/13/17 10:00  12/13/17 03:58  12/13/17 10:00  12/13/17 03:58











General appearance: Present: mild distress





Results





- Labs


CBC & Chem 7: 


 12/13/17 04:00





 12/14/17 06:08


Labs: 


 Laboratory Last Values











WBC  5.2 K/mm3 (4.5-11.0)   12/13/17  04:00    


 


RBC  4.25 M/mm3 (3.65-5.03)   12/13/17  04:00    


 


Hgb  13.7 gm/dl (11.8-15.2)   12/13/17  04:00    


 


Hct  42.5 % (35.5-45.6)   12/13/17  04:00    


 


MCV  100 fl (84-94)  H  12/13/17  04:00    


 


MCH  32 pg (28-32)   12/13/17  04:00    


 


MCHC  32 % (32-34)   12/13/17  04:00    


 


RDW  17.5 % (13.2-15.2)  H  12/13/17  04:00    


 


Plt Count  127 K/mm3 (140-440)  L  12/13/17  04:00    


 


Lymph % (Auto)  10.4 % (13.4-35.0)  L  12/10/17  12:21    


 


Mono % (Auto)  8.4 % (0.0-7.3)  H  12/10/17  12:21    


 


Eos % (Auto)  0.1 % (0.0-4.3)   12/10/17  12:21    


 


Baso % (Auto)  0.7 % (0.0-1.8)   12/10/17  12:21    


 


Lymph #  0.9 K/mm3 (1.2-5.4)  L  12/10/17  12:21    


 


Mono #  0.7 K/mm3 (0.0-0.8)   12/10/17  12:21    


 


Eos #  0.0 K/mm3 (0.0-0.4)   12/10/17  12:21    


 


Baso #  0.1 K/mm3 (0.0-0.1)   12/10/17  12:21    


 


Seg Neutrophils %  80.4 % (40.0-70.0)  H  12/10/17  12:21    


 


Seg Neutrophils #  7.1 K/mm3 (1.8-7.7)   12/10/17  12:21    


 


PT  16.6 Sec. (12.2-14.9)  H  12/11/17  04:54    


 


INR  1.27  (0.87-1.13)  H  12/11/17  04:54    


 


APTT  33.8 Sec. (24.2-36.6)   12/11/17  04:54    


 


Heparin Anti-Xa Level  < 0.10 U.I./ml (0.3-0.7)  L  12/13/17  06:00    


 


Sodium  146 mmol/L (137-145)  H  12/13/17  04:00    


 


Potassium  4.7 mmol/L (3.6-5.0)   12/13/17  04:00    


 


Chloride  108.3 mmol/L ()  H  12/13/17  04:00    


 


Carbon Dioxide  29 mmol/L (22-30)   12/13/17  04:00    


 


Anion Gap  13 mmol/L  12/13/17  04:00    


 


BUN  26 mg/dL (9-20)  H  12/13/17  04:00    


 


Creatinine  1.2 mg/dL (0.8-1.5)   12/13/17  04:00    


 


Estimated GFR  > 60 ml/min  12/13/17  04:00    


 


BUN/Creatinine Ratio  22 %  12/13/17  04:00    


 


Glucose  86 mg/dL ()   12/13/17  04:00    


 


POC Glucose  96  ()   12/12/17  21:31    


 


Calcium  8.6 mg/dL (8.4-10.2)   12/13/17  04:00    


 


Magnesium  2.10 mg/dL (1.7-2.3)   12/10/17  12:21    


 


Total Bilirubin  1.70 mg/dL (0.1-1.2)  H  12/10/17  12:21    


 


Direct Bilirubin  0.4 mg/dL (0-0.2)  H  12/10/17  12:21    


 


Indirect Bilirubin  1.3 mg/dL  12/10/17  12:21    


 


AST  27 units/L (5-40)   12/10/17  12:21    


 


ALT  24 units/L (7-56)   12/10/17  12:21    


 


Alkaline Phosphatase  64 units/L ()   12/10/17  12:21    


 


Total Creatine Kinase  174 units/L ()  H  12/11/17  04:54    


 


CK-MB (CK-2)  7.9 ng/mL (0.0-4.0)  H  12/11/17  04:54    


 


CK-MB (CK-2) Rel Index  4.5  (0-4)  H  12/11/17  04:54    


 


Troponin T  0.028 ng/mL (0.00-0.029)   12/11/17  04:54    


 


NT-Pro-B Natriuret Pep  89942 pg/mL (0-900)  H  12/10/17  12:21    


 


Total Protein  7.0 g/dL (6.3-8.2)   12/10/17  12:21    


 


Albumin  3.1 g/dL (3.9-5)  L  12/10/17  12:21    


 


Albumin/Globulin Ratio  0.8 %  12/10/17  12:21    


 


Triglycerides  91 mg/dL (2-149)   12/10/17  12:21    


 


Cholesterol  154 mg/dL ()   12/10/17  12:21    


 


LDL Cholesterol Direct  87 mg/dL ()   12/10/17  12:21    


 


HDL Cholesterol  49 mg/dL (40-59)   12/10/17  12:21    


 


Cholesterol/HDL Ratio  3.14 %  12/10/17  12:21    


 


Urine Color  Straw  (Yellow)   12/11/17  00:49    


 


Urine Turbidity  Clear  (Clear)   12/11/17  00:49    


 


Urine pH  5.0  (5.0-7.0)   12/11/17  00:49    


 


Ur Specific Gravity  1.005  (1.003-1.030)   12/11/17  00:49    


 


Urine Protein  <15 mg/dl mg/dL (Negative)   12/11/17  00:49    


 


Urine Glucose (UA)  Neg mg/dL (Negative)   12/11/17  00:49    


 


Urine Ketones  Neg mg/dL (Negative)   12/11/17  00:49    


 


Urine Blood  Neg  (Negative)   12/11/17  00:49    


 


Urine Nitrite  Neg  (Negative)   12/11/17  00:49    


 


Urine Bilirubin  Neg  (Negative)   12/11/17  00:49    


 


Urine Urobilinogen  < 2.0 mg/dL (<2.0)   12/11/17  00:49    


 


Ur Leukocyte Esterase  Neg  (Negative)   12/11/17  00:49    


 


Urine WBC (Auto)  1.0 /HPF (0.0-6.0)   12/11/17  00:49    


 


Urine RBC (Auto)  < 1.0 /HPF (0.0-6.0)   12/11/17  00:49    


 


U Epithel Cells (Auto)  < 1.0 /HPF (0-13.0)   12/11/17  00:49    


 


Urine Mucus  Few /HPF  12/11/17  00:49    


 


Blood Type  A POSITIVE   12/10/17  12:21    


 


Antibody Screen  Negative   12/10/17  12:21

## 2017-12-13 NOTE — PROGRESS NOTE
Assessment and Plan


Assessment:


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


Recurrent chest pains - currently resolved


Mild increase in troponins - trending down; currenly nonspecific in setting of 

acutely decompensated heart failure and ? CKD


CMP - presumably nonischemic as stress test today was negative for ischemia


H/o HTN - currently with borderline hypotension 


DJD


Mild hyperkalemia - improved


Possible CKD 


Complete LBBB


NSVT





Plan:


Increase lasix to 40mg IV BID given persistent BLE edema. Repeat BMP in AM. 


Await LifeVest placement. 





The patient has been seen in conjunction with Dr. Foley who agrees with the 

assessment and plan of care.








Subjective


Date of service: 12/13/17


Principal diagnosis: chest pain


Interval history: 


pt resting comfortably, denies cp or SOB. BLE edema persists. Awaiting LifeVest 

placement. 








Objective





 Last Vital Signs











Temp  97.8 F   12/13/17 03:58


 


Pulse  80   12/13/17 10:00


 


Resp  18   12/13/17 03:58


 


BP  102/60   12/13/17 10:00


 


Pulse Ox  100   12/13/17 03:58








 











- Physical Examination


General: No Apparent Distress


HEENT: Positive: PERRL, Normocephaly, Sinus Tenderness


Neck: Positive: neck supple, trachea midline


Cardiac: Positive: Reg Rate and Rhythm, S1/S2


Lungs: Positive: clear to auscultation


Neuro: Positive: Grossly Intact


Abdomen: Positive: Soft.  Negative: Tender


Skin: Positive: Clear.  Negative: Rash, Wound


Extremities: Present: +2 Edema (BLE)





- Labs and Meds


 CBC











  12/13/17 Range/Units





  04:00 


 


WBC  5.2  (4.5-11.0)  K/mm3


 


RBC  4.25  (3.65-5.03)  M/mm3


 


Hgb  13.7  (11.8-15.2)  gm/dl


 


Hct  42.5  (35.5-45.6)  %


 


Plt Count  127 L  (140-440)  K/mm3








 Comprehensive Metabolic Panel











  12/13/17 Range/Units





  04:00 


 


Sodium  146 H  (137-145)  mmol/L


 


Potassium  4.7  (3.6-5.0)  mmol/L


 


Chloride  108.3 H  ()  mmol/L


 


Carbon Dioxide  29  (22-30)  mmol/L


 


BUN  26 H  (9-20)  mg/dL


 


Creatinine  1.2  (0.8-1.5)  mg/dL


 


Glucose  86  ()  mg/dL


 


Calcium  8.6  (8.4-10.2)  mg/dL














- Telemetry


EKG Rhythm: Sinus Rhythm

## 2017-12-14 LAB
ANION GAP SERPL CALC-SCNC: 12 MMOL/L
BUN SERPL-MCNC: 25 MG/DL (ref 9–20)
BUN/CREAT SERPL: 23 %
CALCIUM SERPL-MCNC: 8.3 MG/DL (ref 8.4–10.2)
CHLORIDE SERPL-SCNC: 102.4 MMOL/L (ref 98–107)
CO2 SERPL-SCNC: 33 MMOL/L (ref 22–30)
GLUCOSE SERPL-MCNC: 90 MG/DL (ref 75–100)
POTASSIUM SERPL-SCNC: 4.1 MMOL/L (ref 3.6–5)
SODIUM SERPL-SCNC: 143 MMOL/L (ref 137–145)

## 2017-12-14 RX ADMIN — LISINOPRIL SCH: 5 TABLET ORAL at 10:12

## 2017-12-14 RX ADMIN — HEPARIN SODIUM SCH UNIT: 5000 INJECTION, SOLUTION INTRAVENOUS; SUBCUTANEOUS at 22:01

## 2017-12-14 RX ADMIN — CARVEDILOL SCH: 6.25 TABLET, FILM COATED ORAL at 10:11

## 2017-12-14 RX ADMIN — ASPIRIN SCH MG: 81 TABLET, CHEWABLE ORAL at 10:11

## 2017-12-14 RX ADMIN — FUROSEMIDE SCH: 10 INJECTION, SOLUTION INTRAVENOUS at 19:12

## 2017-12-14 RX ADMIN — HEPARIN SODIUM SCH UNIT: 5000 INJECTION, SOLUTION INTRAVENOUS; SUBCUTANEOUS at 06:04

## 2017-12-14 RX ADMIN — HEPARIN SODIUM SCH UNIT: 5000 INJECTION, SOLUTION INTRAVENOUS; SUBCUTANEOUS at 14:13

## 2017-12-14 RX ADMIN — NITROGLYCERIN SCH: 20 OINTMENT TOPICAL at 10:11

## 2017-12-14 RX ADMIN — FUROSEMIDE SCH: 10 INJECTION, SOLUTION INTRAVENOUS at 06:04

## 2017-12-14 RX ADMIN — NITROGLYCERIN SCH: 20 OINTMENT TOPICAL at 06:05

## 2017-12-14 RX ADMIN — NITROGLYCERIN SCH: 20 OINTMENT TOPICAL at 19:11

## 2017-12-14 RX ADMIN — DOBUTAMINE IN DEXTROSE SCH MLS/HR: 200 INJECTION, SOLUTION INTRAVENOUS at 14:05

## 2017-12-14 NOTE — PROGRESS NOTE
Assessment and Plan


Assessment:


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


Recurrent chest pains - currently resolved


Mild increase in troponins - trending down; currenly nonspecific in setting of 

acutely decompensated heart failure and ? CKD


CMP - presumably nonischemic as stress test was negative for ischemia


H/o HTN - currently with borderline hypotension 


DJD


Mild hyperkalemia - improved


Possible CKD 


Complete LBBB


NSVT





Plan:


Will initiate trial of dobutamine gtt @ 5mcg/kg/min. Cont IV lasix. 


Await LifeVest placement. 





The patient has been seen in conjunction with Dr. Foley who agrees with the 

assessment and plan of care.








Subjective


Date of service: 12/14/17


Principal diagnosis: chest pain


Interval history: 


pt resting comfortably, denies cp or SOB. BLE edema persists. Awaiting LifeVest 

placement. BPs low. 








Objective





 Last Vital Signs











Temp  98.3 F   12/14/17 03:57


 


Pulse  91 H  12/14/17 06:05


 


Resp  20   12/14/17 03:57


 


BP  85/60   12/14/17 06:05


 


Pulse Ox  97   12/14/17 08:06








 











- Physical Examination


General: No Apparent Distress


HEENT: Positive: PERRL, Normocephaly, Sinus Tenderness


Neck: Positive: neck supple, trachea midline


Cardiac: Positive: Reg Rate and Rhythm, S1/S2


Lungs: Positive: Decreased Breath Sounds


Neuro: Positive: Grossly Intact


Abdomen: Positive: Soft.  Negative: Tender


Skin: Positive: Clear.  Negative: Rash, Wound


Extremities: Present: +2 Edema (BLE)





- Labs and Meds


 Comprehensive Metabolic Panel











  12/14/17 Range/Units





  06:08 


 


Sodium  143  (137-145)  mmol/L


 


Potassium  4.1  (3.6-5.0)  mmol/L


 


Chloride  102.4  ()  mmol/L


 


Carbon Dioxide  33 H  (22-30)  mmol/L


 


BUN  25 H  (9-20)  mg/dL


 


Creatinine  1.1  (0.8-1.5)  mg/dL


 


Glucose  90  ()  mg/dL


 


Calcium  8.3 L  (8.4-10.2)  mg/dL

## 2017-12-14 NOTE — PROGRESS NOTE
Assessment and Plan


Assessment and plan: 


Patient is a 77 year old male with long standing hx of HTN, DJD, admitted with 

severe shortness of breath, ongoing for 2 months and worse in the last ffew 

days with associated lower ext swelling. Occasional Alcohol per hx and current 

tobacco use. 





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


* I/O, DAILY Weight, cardiology input noted, coreg, hold Lisinopril, Discussed 

with cardiology will need Life vest due to EF of 10%


* Stress test with markedly dilated LV with severely decreased EF of 10%. 

Multiple fixed perfusion defects with minimal reversibility in the inferior 

wall - suggestive of dilated nonischemic cardiomyopathy.


* Discussed with cardiology, will start trial of dobutamin gtt


Atypical chest pain-Resolved


* significantly dilated LV with noted severely decreased EF. will need a life 

vest. Likely secondary to CAD, will monitor. Now resolved, STATIN THERAPY


 KASSIE


* Elevated BUN 26. Likely secondry to Vasomotor nephropathy from diuresis. 

Trend BUN and CR. Hold ACEI


 Hyperkalemia


* Resolved, anticipate decreased with diuresis. Trend


Non Ischemic cardiomyopathy


* cardiology following


DJD


* stable, ambulates with a cane


complete LBBB


* Cardiology following


 Abnormal Tasha


* Elevate Troponin like secondary to cardiomyopathy


DVT PPx with Heparin and GI with pepcid


Plan discussed with patient in detail .


Plan discusssed with cardiology and also with the patient. 








History


Interval history: 


Patient seen and examined in no acute distress. He still with some shortness of 

breath with orthopena but reports improvement in his lower ext swelling. He 

denies any chest pain, nausea, vomiting or diarrhea. 








Hospitalist Physical





- Physical exam


Narrative exam: 


General appearance: Present: no acute distress





- EENT


Eyes: Present: PERRL, EOM intact


ENT: hearing intact, clear oral mucosa





- Neck


Neck: Present: supple, normal ROM





- Respiratory


Respiratory effort: normal


Respiratory: bilateral: rhonchi





- Cardiovascular


Rhythm: regular


Heart Sounds: Present: S1 & S2





- Extremities


Extremities: no ischemia, pulses intact, pulses symmetrical


Extremity abnormal: edema (2+ pitting edema)


Peripheral Pulses: within normal limits





- Abdominal


General gastrointestinal: soft, non-tender, non-distended





- Integumentary


Integumentary: Present: clear, warm





- Psychiatric


Psychiatric: appropriate mood/affect, intact judgment & insight





- Neurologic


Neurologic: CNII-XII intact, moves all extremities





- Allied Health


Allied health notes reviewed: nursing











- Constitutional


Vitals: 


 











Temp Pulse Resp BP Pulse Ox


 


 98.3 F   106 H  20   96/68   97 


 


 12/14/17 03:57  12/14/17 15:10  12/14/17 03:57  12/14/17 15:10  12/14/17 08:06











General appearance: Present: mild distress





Results





- Labs


CBC & Chem 7: 


 12/13/17 04:00





 12/14/17 06:08


Labs: 


 Laboratory Last Values











WBC  5.2 K/mm3 (4.5-11.0)   12/13/17  04:00    


 


RBC  4.25 M/mm3 (3.65-5.03)   12/13/17  04:00    


 


Hgb  13.7 gm/dl (11.8-15.2)   12/13/17  04:00    


 


Hct  42.5 % (35.5-45.6)   12/13/17  04:00    


 


MCV  100 fl (84-94)  H  12/13/17  04:00    


 


MCH  32 pg (28-32)   12/13/17  04:00    


 


MCHC  32 % (32-34)   12/13/17  04:00    


 


RDW  17.5 % (13.2-15.2)  H  12/13/17  04:00    


 


Plt Count  127 K/mm3 (140-440)  L  12/13/17  04:00    


 


Lymph % (Auto)  10.4 % (13.4-35.0)  L  12/10/17  12:21    


 


Mono % (Auto)  8.4 % (0.0-7.3)  H  12/10/17  12:21    


 


Eos % (Auto)  0.1 % (0.0-4.3)   12/10/17  12:21    


 


Baso % (Auto)  0.7 % (0.0-1.8)   12/10/17  12:21    


 


Lymph #  0.9 K/mm3 (1.2-5.4)  L  12/10/17  12:21    


 


Mono #  0.7 K/mm3 (0.0-0.8)   12/10/17  12:21    


 


Eos #  0.0 K/mm3 (0.0-0.4)   12/10/17  12:21    


 


Baso #  0.1 K/mm3 (0.0-0.1)   12/10/17  12:21    


 


Seg Neutrophils %  80.4 % (40.0-70.0)  H  12/10/17  12:21    


 


Seg Neutrophils #  7.1 K/mm3 (1.8-7.7)   12/10/17  12:21    


 


PT  16.6 Sec. (12.2-14.9)  H  12/11/17  04:54    


 


INR  1.27  (0.87-1.13)  H  12/11/17  04:54    


 


APTT  33.8 Sec. (24.2-36.6)   12/11/17  04:54    


 


Heparin Anti-Xa Level  < 0.10 U.I./ml (0.3-0.7)  L  12/13/17  06:00    


 


Sodium  143 mmol/L (137-145)   12/14/17  06:08    


 


Potassium  4.1 mmol/L (3.6-5.0)   12/14/17  06:08    


 


Chloride  102.4 mmol/L ()   12/14/17  06:08    


 


Carbon Dioxide  33 mmol/L (22-30)  H  12/14/17  06:08    


 


Anion Gap  12 mmol/L  12/14/17  06:08    


 


BUN  25 mg/dL (9-20)  H  12/14/17  06:08    


 


Creatinine  1.1 mg/dL (0.8-1.5)   12/14/17  06:08    


 


Estimated GFR  > 60 ml/min  12/14/17  06:08    


 


BUN/Creatinine Ratio  23 %  12/14/17  06:08    


 


Glucose  90 mg/dL ()   12/14/17  06:08    


 


POC Glucose  96  ()   12/12/17  21:31    


 


Calcium  8.3 mg/dL (8.4-10.2)  L  12/14/17  06:08    


 


Magnesium  2.10 mg/dL (1.7-2.3)   12/10/17  12:21    


 


Total Bilirubin  1.70 mg/dL (0.1-1.2)  H  12/10/17  12:21    


 


Direct Bilirubin  0.4 mg/dL (0-0.2)  H  12/10/17  12:21    


 


Indirect Bilirubin  1.3 mg/dL  12/10/17  12:21    


 


AST  27 units/L (5-40)   12/10/17  12:21    


 


ALT  24 units/L (7-56)   12/10/17  12:21    


 


Alkaline Phosphatase  64 units/L ()   12/10/17  12:21    


 


Total Creatine Kinase  174 units/L ()  H  12/11/17  04:54    


 


CK-MB (CK-2)  7.9 ng/mL (0.0-4.0)  H  12/11/17  04:54    


 


CK-MB (CK-2) Rel Index  4.5  (0-4)  H  12/11/17  04:54    


 


Troponin T  0.028 ng/mL (0.00-0.029)   12/11/17  04:54    


 


NT-Pro-B Natriuret Pep  66693 pg/mL (0-900)  H  12/10/17  12:21    


 


Total Protein  7.0 g/dL (6.3-8.2)   12/10/17  12:21    


 


Albumin  3.1 g/dL (3.9-5)  L  12/10/17  12:21    


 


Albumin/Globulin Ratio  0.8 %  12/10/17  12:21    


 


Triglycerides  91 mg/dL (2-149)   12/10/17  12:21    


 


Cholesterol  154 mg/dL ()   12/10/17  12:21    


 


LDL Cholesterol Direct  87 mg/dL ()   12/10/17  12:21    


 


HDL Cholesterol  49 mg/dL (40-59)   12/10/17  12:21    


 


Cholesterol/HDL Ratio  3.14 %  12/10/17  12:21    


 


Urine Color  Straw  (Yellow)   12/11/17  00:49    


 


Urine Turbidity  Clear  (Clear)   12/11/17  00:49    


 


Urine pH  5.0  (5.0-7.0)   12/11/17  00:49    


 


Ur Specific Gravity  1.005  (1.003-1.030)   12/11/17  00:49    


 


Urine Protein  <15 mg/dl mg/dL (Negative)   12/11/17  00:49    


 


Urine Glucose (UA)  Neg mg/dL (Negative)   12/11/17  00:49    


 


Urine Ketones  Neg mg/dL (Negative)   12/11/17  00:49    


 


Urine Blood  Neg  (Negative)   12/11/17  00:49    


 


Urine Nitrite  Neg  (Negative)   12/11/17  00:49    


 


Urine Bilirubin  Neg  (Negative)   12/11/17  00:49    


 


Urine Urobilinogen  < 2.0 mg/dL (<2.0)   12/11/17  00:49    


 


Ur Leukocyte Esterase  Neg  (Negative)   12/11/17  00:49    


 


Urine WBC (Auto)  1.0 /HPF (0.0-6.0)   12/11/17  00:49    


 


Urine RBC (Auto)  < 1.0 /HPF (0.0-6.0)   12/11/17  00:49    


 


U Epithel Cells (Auto)  < 1.0 /HPF (0-13.0)   12/11/17  00:49    


 


Urine Mucus  Few /HPF  12/11/17  00:49    


 


Blood Type  A POSITIVE   12/10/17  12:21    


 


Antibody Screen  Negative   12/10/17  12:21

## 2017-12-15 RX ADMIN — ASPIRIN SCH MG: 81 TABLET, CHEWABLE ORAL at 10:16

## 2017-12-15 RX ADMIN — HEPARIN SODIUM SCH UNIT: 5000 INJECTION, SOLUTION INTRAVENOUS; SUBCUTANEOUS at 21:35

## 2017-12-15 RX ADMIN — FUROSEMIDE SCH: 10 INJECTION, SOLUTION INTRAVENOUS at 05:08

## 2017-12-15 RX ADMIN — HEPARIN SODIUM SCH UNIT: 5000 INJECTION, SOLUTION INTRAVENOUS; SUBCUTANEOUS at 13:30

## 2017-12-15 RX ADMIN — NITROGLYCERIN SCH: 20 OINTMENT TOPICAL at 17:51

## 2017-12-15 RX ADMIN — HEPARIN SODIUM SCH UNIT: 5000 INJECTION, SOLUTION INTRAVENOUS; SUBCUTANEOUS at 05:04

## 2017-12-15 RX ADMIN — DOBUTAMINE IN DEXTROSE SCH MLS/HR: 200 INJECTION, SOLUTION INTRAVENOUS at 23:44

## 2017-12-15 RX ADMIN — FUROSEMIDE SCH: 10 INJECTION, SOLUTION INTRAVENOUS at 19:54

## 2017-12-15 RX ADMIN — DOBUTAMINE IN DEXTROSE SCH MLS/HR: 200 INJECTION, SOLUTION INTRAVENOUS at 10:27

## 2017-12-15 RX ADMIN — NITROGLYCERIN SCH: 20 OINTMENT TOPICAL at 10:16

## 2017-12-15 RX ADMIN — NITROGLYCERIN SCH: 20 OINTMENT TOPICAL at 19:54

## 2017-12-15 RX ADMIN — NITROGLYCERIN SCH: 20 OINTMENT TOPICAL at 05:08

## 2017-12-15 NOTE — PROGRESS NOTE
Assessment and Plan


Assessment:


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


Recurrent chest pains - currently resolved


Mild increase in troponins - trending down; currenly nonspecific in setting of 

acutely decompensated heart failure and ? CKD


CMP - presumably nonischemic as stress test was negative for ischemia


H/o HTN - currently with borderline hypotension 


DJD


Mild hyperkalemia - improved


Thrombocytopenia 


Possible CKD 


Complete LBBB


NSVT





Plan:


Cont dobutamine gtt @ 5mcg/kg/min. Cont IV lasix. 


LifeVest in place. 


Possible discharge on Sunday/Monday after 72 hours of dobutamine. 





The patient has been seen in conjunction with Dr. Foley who agrees with the 

assessment and plan of care.








Subjective


Date of service: 12/15/17


Principal diagnosis: chest pain


Interval history: 


pt resting comfortably, denies cp or SOB. BLE edema persists. LifeVest in 

place. Dobutamine gtt infusing - was held overnight d/t low BPs per primary RN. 








Objective





 Last Vital Signs











Temp  98.8 F   12/15/17 08:20


 


Pulse  89   12/15/17 08:20


 


Resp  18   12/15/17 08:20


 


BP  88/59   12/15/17 08:20


 


Pulse Ox  99   12/15/17 08:20








 











- Physical Examination


General: No Apparent Distress


HEENT: Positive: PERRL, Normocephaly, Sinus Tenderness


Neck: Positive: neck supple, trachea midline


Cardiac: Positive: Reg Rate and Rhythm, S1/S2


Lungs: Positive: clear to auscultation


Neuro: Positive: Grossly Intact


Abdomen: Positive: Soft.  Negative: Tender


Skin: Positive: Clear.  Negative: Rash, Wound


Extremities: Present: +2 Edema (BLE)

## 2017-12-15 NOTE — PROGRESS NOTE
Assessment and Plan


Assessment and plan: 


Patient is a 77 year old male with long standing hx of HTN, DJD, admitted with 

severe shortness of breath, ongoing for 2 months and worse in the last ffew 

days with associated lower ext swelling. Occasional Alcohol per hx and current 

tobacco use. 





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


* I/O, DAILY Weight, cardiology input noted, coreg, hold Lisinopril, Discussed 

with cardiology will need Life vest due to EF of 10%


* Stress test with markedly dilated LV with severely decreased EF of 10%. 

Multiple fixed perfusion defects with minimal reversibility in the inferior 

wall - suggestive of dilated nonischemic cardiomyopathy.


* Discussed with cardiology, continue trial of dobutamin gtt


* Doppler lower ext to r/o DVT


Bilateral leg chronic wound with poor healing


* Wound care consult


Atypical chest pain-Resolved


* significantly dilated LV with noted severely decreased EF. will need a life 

vest. Likely secondary to CAD, will monitor. Now resolved, Satin THERAPY


 KASSIE


* Elevated BUN 26. Likely secondry to Vasomotor nephropathy from diuresis. 

Trend BUN and CR. Hold ACEI


 Hyperkalemia


* Resolved, anticipate decreased with diuresis. Trend


Non Ischemic cardiomyopathy


* cardiology following


DJD


* stable, ambulates with a cane


complete LBBB


* Cardiology following


 Abnormal Tasha


* Elevate Troponin like secondary to cardiomyopathy


DVT PPx with Heparin and GI with pepcid


Plan discussed with patient in detail .


Plan discusssed with cardiology and also with the patient. 








History


Interval history: 


Patient seen and examined in no acute distress. shortness of breath still 

persist, Some bilateral leg pain around leg wound. Denies chest pain, nausea or 

vomiting. still with some orthopnia





Hospitalist Physical





- Physical exam


Narrative exam: 


General appearance: Present: no acute distress





- EENT


Eyes: Present: PERRL, EOM intact


ENT: hearing intact, clear oral mucosa





- Neck


Neck: Present: supple, normal ROM





- Respiratory


Respiratory effort: normal


Respiratory: bilateral: rhonchi





- Cardiovascular


Rhythm: regular


Heart Sounds: Present: S1 & S2





- Extremities


Extremities: no ischemia, pulses intact, pulses symmetrical


Extremity abnormal: edema (2+ pitting edema)


Peripheral Pulses: within normal limits





- Abdominal


General gastrointestinal: soft, non-tender, non-distended





- Integumentary


Integumentary: Present: clear, warm





- Psychiatric


Psychiatric: appropriate mood/affect, intact judgment & insight





- Neurologic


Neurologic: CNII-XII intact, moves all extremities





- Allied Health


Allied health notes reviewed: nursing











- Constitutional


Vitals: 


 











Temp Pulse Resp BP Pulse Ox


 


 97.9 F   107 H  18   93/63   98 


 


 12/15/17 15:42  12/15/17 15:42  12/15/17 15:42  12/15/17 15:42  12/15/17 15:42











General appearance: Present: mild distress





Results





- Labs


CBC & Chem 7: 


 12/13/17 04:00





 12/14/17 06:08


Labs: 


 Laboratory Last Values











WBC  5.2 K/mm3 (4.5-11.0)   12/13/17  04:00    


 


RBC  4.25 M/mm3 (3.65-5.03)   12/13/17  04:00    


 


Hgb  13.7 gm/dl (11.8-15.2)   12/13/17  04:00    


 


Hct  42.5 % (35.5-45.6)   12/13/17  04:00    


 


MCV  100 fl (84-94)  H  12/13/17  04:00    


 


MCH  32 pg (28-32)   12/13/17  04:00    


 


MCHC  32 % (32-34)   12/13/17  04:00    


 


RDW  17.5 % (13.2-15.2)  H  12/13/17  04:00    


 


Plt Count  127 K/mm3 (140-440)  L  12/13/17  04:00    


 


Lymph % (Auto)  10.4 % (13.4-35.0)  L  12/10/17  12:21    


 


Mono % (Auto)  8.4 % (0.0-7.3)  H  12/10/17  12:21    


 


Eos % (Auto)  0.1 % (0.0-4.3)   12/10/17  12:21    


 


Baso % (Auto)  0.7 % (0.0-1.8)   12/10/17  12:21    


 


Lymph #  0.9 K/mm3 (1.2-5.4)  L  12/10/17  12:21    


 


Mono #  0.7 K/mm3 (0.0-0.8)   12/10/17  12:21    


 


Eos #  0.0 K/mm3 (0.0-0.4)   12/10/17  12:21    


 


Baso #  0.1 K/mm3 (0.0-0.1)   12/10/17  12:21    


 


Seg Neutrophils %  80.4 % (40.0-70.0)  H  12/10/17  12:21    


 


Seg Neutrophils #  7.1 K/mm3 (1.8-7.7)   12/10/17  12:21    


 


PT  16.6 Sec. (12.2-14.9)  H  12/11/17  04:54    


 


INR  1.27  (0.87-1.13)  H  12/11/17  04:54    


 


APTT  33.8 Sec. (24.2-36.6)   12/11/17  04:54    


 


Heparin Anti-Xa Level  < 0.10 U.I./ml (0.3-0.7)  L  12/13/17  06:00    


 


Sodium  143 mmol/L (137-145)   12/14/17  06:08    


 


Potassium  4.1 mmol/L (3.6-5.0)   12/14/17  06:08    


 


Chloride  102.4 mmol/L ()   12/14/17  06:08    


 


Carbon Dioxide  33 mmol/L (22-30)  H  12/14/17  06:08    


 


Anion Gap  12 mmol/L  12/14/17  06:08    


 


BUN  25 mg/dL (9-20)  H  12/14/17  06:08    


 


Creatinine  1.1 mg/dL (0.8-1.5)   12/14/17  06:08    


 


Estimated GFR  > 60 ml/min  12/14/17  06:08    


 


BUN/Creatinine Ratio  23 %  12/14/17  06:08    


 


Glucose  90 mg/dL ()   12/14/17  06:08    


 


POC Glucose  96  ()   12/12/17  21:31    


 


Calcium  8.3 mg/dL (8.4-10.2)  L  12/14/17  06:08    


 


Magnesium  2.10 mg/dL (1.7-2.3)   12/10/17  12:21    


 


Total Bilirubin  1.70 mg/dL (0.1-1.2)  H  12/10/17  12:21    


 


Direct Bilirubin  0.4 mg/dL (0-0.2)  H  12/10/17  12:21    


 


Indirect Bilirubin  1.3 mg/dL  12/10/17  12:21    


 


AST  27 units/L (5-40)   12/10/17  12:21    


 


ALT  24 units/L (7-56)   12/10/17  12:21    


 


Alkaline Phosphatase  64 units/L ()   12/10/17  12:21    


 


Total Creatine Kinase  174 units/L ()  H  12/11/17  04:54    


 


CK-MB (CK-2)  7.9 ng/mL (0.0-4.0)  H  12/11/17  04:54    


 


CK-MB (CK-2) Rel Index  4.5  (0-4)  H  12/11/17  04:54    


 


Troponin T  0.028 ng/mL (0.00-0.029)   12/11/17  04:54    


 


NT-Pro-B Natriuret Pep  60596 pg/mL (0-900)  H  12/10/17  12:21    


 


Total Protein  7.0 g/dL (6.3-8.2)   12/10/17  12:21    


 


Albumin  3.1 g/dL (3.9-5)  L  12/10/17  12:21    


 


Albumin/Globulin Ratio  0.8 %  12/10/17  12:21    


 


Triglycerides  91 mg/dL (2-149)   12/10/17  12:21    


 


Cholesterol  154 mg/dL ()   12/10/17  12:21    


 


LDL Cholesterol Direct  87 mg/dL ()   12/10/17  12:21    


 


HDL Cholesterol  49 mg/dL (40-59)   12/10/17  12:21    


 


Cholesterol/HDL Ratio  3.14 %  12/10/17  12:21    


 


Urine Color  Straw  (Yellow)   12/11/17  00:49    


 


Urine Turbidity  Clear  (Clear)   12/11/17  00:49    


 


Urine pH  5.0  (5.0-7.0)   12/11/17  00:49    


 


Ur Specific Gravity  1.005  (1.003-1.030)   12/11/17  00:49    


 


Urine Protein  <15 mg/dl mg/dL (Negative)   12/11/17  00:49    


 


Urine Glucose (UA)  Neg mg/dL (Negative)   12/11/17  00:49    


 


Urine Ketones  Neg mg/dL (Negative)   12/11/17  00:49    


 


Urine Blood  Neg  (Negative)   12/11/17  00:49    


 


Urine Nitrite  Neg  (Negative)   12/11/17  00:49    


 


Urine Bilirubin  Neg  (Negative)   12/11/17  00:49    


 


Urine Urobilinogen  < 2.0 mg/dL (<2.0)   12/11/17  00:49    


 


Ur Leukocyte Esterase  Neg  (Negative)   12/11/17  00:49    


 


Urine WBC (Auto)  1.0 /HPF (0.0-6.0)   12/11/17  00:49    


 


Urine RBC (Auto)  < 1.0 /HPF (0.0-6.0)   12/11/17  00:49    


 


U Epithel Cells (Auto)  < 1.0 /HPF (0-13.0)   12/11/17  00:49    


 


Urine Mucus  Few /HPF  12/11/17  00:49    


 


Blood Type  A POSITIVE   12/10/17  12:21    


 


Antibody Screen  Negative   12/10/17  12:21    














- Imaging and Cardiology


Venous US: pending

## 2017-12-16 RX ADMIN — FUROSEMIDE SCH MG: 10 INJECTION, SOLUTION INTRAVENOUS at 18:34

## 2017-12-16 RX ADMIN — HEPARIN SODIUM SCH UNIT: 5000 INJECTION, SOLUTION INTRAVENOUS; SUBCUTANEOUS at 14:55

## 2017-12-16 RX ADMIN — DOBUTAMINE IN DEXTROSE SCH MLS/HR: 200 INJECTION, SOLUTION INTRAVENOUS at 21:00

## 2017-12-16 RX ADMIN — NITROGLYCERIN SCH INCH: 20 OINTMENT TOPICAL at 09:48

## 2017-12-16 RX ADMIN — ASPIRIN SCH MG: 81 TABLET, CHEWABLE ORAL at 09:48

## 2017-12-16 RX ADMIN — NITROGLYCERIN SCH: 20 OINTMENT TOPICAL at 14:55

## 2017-12-16 RX ADMIN — FUROSEMIDE SCH MG: 10 INJECTION, SOLUTION INTRAVENOUS at 06:04

## 2017-12-16 RX ADMIN — HEPARIN SODIUM SCH UNIT: 5000 INJECTION, SOLUTION INTRAVENOUS; SUBCUTANEOUS at 22:24

## 2017-12-16 RX ADMIN — NITROGLYCERIN SCH: 20 OINTMENT TOPICAL at 05:42

## 2017-12-16 RX ADMIN — NITROGLYCERIN SCH: 20 OINTMENT TOPICAL at 18:34

## 2017-12-16 RX ADMIN — NITROGLYCERIN SCH: 20 OINTMENT TOPICAL at 09:50

## 2017-12-16 RX ADMIN — HEPARIN SODIUM SCH UNIT: 5000 INJECTION, SOLUTION INTRAVENOUS; SUBCUTANEOUS at 06:04

## 2017-12-16 NOTE — PROGRESS NOTE
Assessment and Plan


Assessment and plan: 


Patient is a 77 year old male with long standing hx of HTN, DJD, admitted with 

severe shortness of breath, ongoing for 2 months and worse in the last ffew 

days with associated lower ext swelling. Occasional Alcohol per hx and current 

tobacco use. 





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


* I/O, DAILY Weight, cardiology input noted, coreg, hold Lisinopril, Discussed 

with cardiology will need Life vest due to EF of 10%


* Stress test with markedly dilated LV with severely decreased EF of 10%. 

Multiple fixed perfusion defects with minimal reversibility in the inferior 

wall - suggestive of dilated nonischemic cardiomyopathy.


* Discussed with cardiology, continue trial of dobutamin gtt


* DOPPLER NEGATIVE FOR DVT


Bilateral leg chronic wound with poor healing


* Wound care consult, DRESSING NOTED


Atypical chest pain-Resolved


* significantly dilated LV with noted severely decreased EF. will need a life 

vest. Likely secondary to CAD, will monitor. Now resolved, Satin THERAPY


 KASSIE


* Likely secondary to Vasomotor nephropathy from diuresis. Trend BUN and CR. 

Hold ACEI


 Hyperkalemia


* Resolved, anticipate decreased with diuresis. Trend


Hypotension


* Likely due to doubtamin, continue to monitor. patient is asymptomatic


Non Ischemic cardiomyopathy


* cardiology following


DJD


* stable, ambulates with a cane


complete LBBB


* Cardiology following


 Abnormal Tasha


* Elevate Troponin like secondary to cardiomyopathy


DVT PPx with Heparin and GI with pepcid


Plan discussed with patient in detail .


Plan discussed with cardiology and also with the patient. 


Anticipate discharge in a Day or two





History


Interval history: 


Patient seen and examined in no acute distress. shortness of breath still 

persist, Some bilateral leg pain around leg wound. Denies chest pain, nausea or 

vomiting. still with some orthopnia





Hospitalist Physical





- Physical exam


Narrative exam: 


General appearance: Present: no acute distress





- EENT


Eyes: Present: PERRL, EOM intact


ENT: hearing intact, clear oral mucosa





- Neck


Neck: Present: supple, normal ROM





- Respiratory


Respiratory effort: normal


Respiratory: bilateral: rhonchi





- Cardiovascular


Rhythm: regular


Heart Sounds: Present: S1 & S2





- Extremities


Extremities: no ischemia, pulses intact, pulses symmetrical


Extremity abnormal: edema (2+ pitting edema)


Peripheral Pulses: within normal limits





- Abdominal


General gastrointestinal: soft, non-tender, non-distended





- Integumentary


Integumentary: Present: DRESSING IN PLACE RIGHT LOWER EXT WOUND STILL MILD 

DRAINAGE





- Psychiatric


Psychiatric: appropriate mood/affect, intact judgment & insight





- Neurologic


Neurologic: CNII-XII intact, moves all extremities





- Allied Health


Allied health notes reviewed: nursing











- Constitutional


Vitals: 


 











Temp Pulse Resp BP Pulse Ox


 


 98.9 F   100 H  20   89/61   98 


 


 12/16/17 12:52  12/16/17 12:52  12/16/17 12:52  12/16/17 12:52  12/16/17 09:04











General appearance: Present: mild distress





Results





- Labs


CBC & Chem 7: 


 12/13/17 04:00





 12/14/17 06:08


Labs: 


 Laboratory Last Values











WBC  5.2 K/mm3 (4.5-11.0)   12/13/17  04:00    


 


RBC  4.25 M/mm3 (3.65-5.03)   12/13/17  04:00    


 


Hgb  13.7 gm/dl (11.8-15.2)   12/13/17  04:00    


 


Hct  42.5 % (35.5-45.6)   12/13/17  04:00    


 


MCV  100 fl (84-94)  H  12/13/17  04:00    


 


MCH  32 pg (28-32)   12/13/17  04:00    


 


MCHC  32 % (32-34)   12/13/17  04:00    


 


RDW  17.5 % (13.2-15.2)  H  12/13/17  04:00    


 


Plt Count  127 K/mm3 (140-440)  L  12/13/17  04:00    


 


Lymph % (Auto)  10.4 % (13.4-35.0)  L  12/10/17  12:21    


 


Mono % (Auto)  8.4 % (0.0-7.3)  H  12/10/17  12:21    


 


Eos % (Auto)  0.1 % (0.0-4.3)   12/10/17  12:21    


 


Baso % (Auto)  0.7 % (0.0-1.8)   12/10/17  12:21    


 


Lymph #  0.9 K/mm3 (1.2-5.4)  L  12/10/17  12:21    


 


Mono #  0.7 K/mm3 (0.0-0.8)   12/10/17  12:21    


 


Eos #  0.0 K/mm3 (0.0-0.4)   12/10/17  12:21    


 


Baso #  0.1 K/mm3 (0.0-0.1)   12/10/17  12:21    


 


Seg Neutrophils %  80.4 % (40.0-70.0)  H  12/10/17  12:21    


 


Seg Neutrophils #  7.1 K/mm3 (1.8-7.7)   12/10/17  12:21    


 


PT  16.6 Sec. (12.2-14.9)  H  12/11/17  04:54    


 


INR  1.27  (0.87-1.13)  H  12/11/17  04:54    


 


APTT  33.8 Sec. (24.2-36.6)   12/11/17  04:54    


 


Heparin Anti-Xa Level  < 0.10 U.I./ml (0.3-0.7)  L  12/13/17  06:00    


 


Sodium  143 mmol/L (137-145)   12/14/17  06:08    


 


Potassium  4.1 mmol/L (3.6-5.0)   12/14/17  06:08    


 


Chloride  102.4 mmol/L ()   12/14/17  06:08    


 


Carbon Dioxide  33 mmol/L (22-30)  H  12/14/17  06:08    


 


Anion Gap  12 mmol/L  12/14/17  06:08    


 


BUN  25 mg/dL (9-20)  H  12/14/17  06:08    


 


Creatinine  1.1 mg/dL (0.8-1.5)   12/14/17  06:08    


 


Estimated GFR  > 60 ml/min  12/14/17  06:08    


 


BUN/Creatinine Ratio  23 %  12/14/17  06:08    


 


Glucose  90 mg/dL ()   12/14/17  06:08    


 


POC Glucose  96  ()   12/12/17  21:31    


 


Calcium  8.3 mg/dL (8.4-10.2)  L  12/14/17  06:08    


 


Magnesium  2.10 mg/dL (1.7-2.3)   12/10/17  12:21    


 


Total Bilirubin  1.70 mg/dL (0.1-1.2)  H  12/10/17  12:21    


 


Direct Bilirubin  0.4 mg/dL (0-0.2)  H  12/10/17  12:21    


 


Indirect Bilirubin  1.3 mg/dL  12/10/17  12:21    


 


AST  27 units/L (5-40)   12/10/17  12:21    


 


ALT  24 units/L (7-56)   12/10/17  12:21    


 


Alkaline Phosphatase  64 units/L ()   12/10/17  12:21    


 


Total Creatine Kinase  174 units/L ()  H  12/11/17  04:54    


 


CK-MB (CK-2)  7.9 ng/mL (0.0-4.0)  H  12/11/17  04:54    


 


CK-MB (CK-2) Rel Index  4.5  (0-4)  H  12/11/17  04:54    


 


Troponin T  0.028 ng/mL (0.00-0.029)   12/11/17  04:54    


 


NT-Pro-B Natriuret Pep  22752 pg/mL (0-900)  H  12/10/17  12:21    


 


Total Protein  7.0 g/dL (6.3-8.2)   12/10/17  12:21    


 


Albumin  3.1 g/dL (3.9-5)  L  12/10/17  12:21    


 


Albumin/Globulin Ratio  0.8 %  12/10/17  12:21    


 


Triglycerides  91 mg/dL (2-149)   12/10/17  12:21    


 


Cholesterol  154 mg/dL ()   12/10/17  12:21    


 


LDL Cholesterol Direct  87 mg/dL ()   12/10/17  12:21    


 


HDL Cholesterol  49 mg/dL (40-59)   12/10/17  12:21    


 


Cholesterol/HDL Ratio  3.14 %  12/10/17  12:21    


 


Urine Color  Straw  (Yellow)   12/11/17  00:49    


 


Urine Turbidity  Clear  (Clear)   12/11/17  00:49    


 


Urine pH  5.0  (5.0-7.0)   12/11/17  00:49    


 


Ur Specific Gravity  1.005  (1.003-1.030)   12/11/17  00:49    


 


Urine Protein  <15 mg/dl mg/dL (Negative)   12/11/17  00:49    


 


Urine Glucose (UA)  Neg mg/dL (Negative)   12/11/17  00:49    


 


Urine Ketones  Neg mg/dL (Negative)   12/11/17  00:49    


 


Urine Blood  Neg  (Negative)   12/11/17  00:49    


 


Urine Nitrite  Neg  (Negative)   12/11/17  00:49    


 


Urine Bilirubin  Neg  (Negative)   12/11/17  00:49    


 


Urine Urobilinogen  < 2.0 mg/dL (<2.0)   12/11/17  00:49    


 


Ur Leukocyte Esterase  Neg  (Negative)   12/11/17  00:49    


 


Urine WBC (Auto)  1.0 /HPF (0.0-6.0)   12/11/17  00:49    


 


Urine RBC (Auto)  < 1.0 /HPF (0.0-6.0)   12/11/17  00:49    


 


U Epithel Cells (Auto)  < 1.0 /HPF (0-13.0)   12/11/17  00:49    


 


Urine Mucus  Few /HPF  12/11/17  00:49    


 


Blood Type  A POSITIVE   12/10/17  12:21    


 


Antibody Screen  Negative   12/10/17  12:21

## 2017-12-16 NOTE — PROGRESS NOTE
Assessment and Plan





Assessment:


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


Recurrent chest pains - currently resolved


Mild increase in troponins - trending down; currenly nonspecific in setting of 

acutely decompensated heart failure and ? CKD


CMP - presumably nonischemic as stress test was negative for ischemia


H/o HTN - currently with borderline hypotension 


DJD


Mild hyperkalemia - improved


Thrombocytopenia 


Possible CKD 


Complete LBBB


NSVT





Plan:


Cont dobutamine gtt @ 5mcg/kg/min. Cont IV lasix. 


LifeVest in place. 


patient continues to be SOB,B.P being on low side,continur dibutamine,


no evidence of throbus on venous flow studies.





Subjective


Date of service: 12/16/17


Principal diagnosis: chest pain


Interval history: 





Still SOB,has leg swelling,had venous flow studies done ,B.P on low side.





Objective


 Vital Signs











  Temp Pulse Resp BP BP Pulse Ox


 


 12/16/17 12:52  98.9 F  100 H  20   89/61 


 


 12/16/17 09:50     90/68  


 


 12/16/17 09:04  98.9 F  20 L  20   90/68  98


 


 12/16/17 05:09  98.6 F  103 H  20  99/65   100


 


 12/15/17 23:55  98.9 F  102 H  20  112/65   96


 


 12/15/17 22:00   96 H    


 


 12/15/17 20:17  98.9 F  100 H  20  90/56   97


 


 12/15/17 15:42  97.9 F  107 H  18  93/63   98














- Physical Examination


General: No Apparent Distress


HEENT: Positive: PERRL, Normocephaly, Sinus Tenderness


Neck: Positive: neck supple, trachea midline, JVD/HJR (markedly elevated.)


Cardiac: Positive: Regular Rhythm


Lungs: Positive: Decreased Breath Sounds


Neuro: Positive: Grossly Intact


Abdomen: Positive: Soft.  Negative: Tender


Skin: Positive: Clear.  Negative: Rash, Wound


Extremities: Present: +2 Edema (BLE)

## 2017-12-17 LAB
ANION GAP SERPL CALC-SCNC: 12 MMOL/L
BUN SERPL-MCNC: 15 MG/DL (ref 9–20)
BUN/CREAT SERPL: 15 %
CALCIUM SERPL-MCNC: 8.5 MG/DL (ref 8.4–10.2)
CHLORIDE SERPL-SCNC: 98.6 MMOL/L (ref 98–107)
CO2 SERPL-SCNC: 33 MMOL/L (ref 22–30)
GLUCOSE SERPL-MCNC: 83 MG/DL (ref 75–100)
POTASSIUM SERPL-SCNC: 4.7 MMOL/L (ref 3.6–5)
SODIUM SERPL-SCNC: 139 MMOL/L (ref 137–145)

## 2017-12-17 RX ADMIN — ASPIRIN SCH MG: 81 TABLET, CHEWABLE ORAL at 10:07

## 2017-12-17 RX ADMIN — FUROSEMIDE SCH MG: 10 INJECTION, SOLUTION INTRAVENOUS at 06:07

## 2017-12-17 RX ADMIN — HEPARIN SODIUM SCH UNIT: 5000 INJECTION, SOLUTION INTRAVENOUS; SUBCUTANEOUS at 14:25

## 2017-12-17 RX ADMIN — DOBUTAMINE IN DEXTROSE SCH MLS/HR: 200 INJECTION, SOLUTION INTRAVENOUS at 16:25

## 2017-12-17 RX ADMIN — HEPARIN SODIUM SCH UNIT: 5000 INJECTION, SOLUTION INTRAVENOUS; SUBCUTANEOUS at 22:39

## 2017-12-17 RX ADMIN — NITROGLYCERIN SCH: 20 OINTMENT TOPICAL at 11:07

## 2017-12-17 RX ADMIN — NITROGLYCERIN SCH: 20 OINTMENT TOPICAL at 14:28

## 2017-12-17 RX ADMIN — HEPARIN SODIUM SCH UNIT: 5000 INJECTION, SOLUTION INTRAVENOUS; SUBCUTANEOUS at 06:07

## 2017-12-17 RX ADMIN — FUROSEMIDE SCH MG: 10 INJECTION, SOLUTION INTRAVENOUS at 18:23

## 2017-12-17 RX ADMIN — NITROGLYCERIN SCH: 20 OINTMENT TOPICAL at 06:24

## 2017-12-17 NOTE — PROGRESS NOTE
Assessment and Plan


Assessment and plan: 





Patient is a 77 year old male with long standing hx of HTN, DJD, admitted with 

severe shortness of breath, ongoing for 2 months and worse in the last few days 

with associated lower ext swelling. Occasional Alcohol per hx and current 

tobacco use. 





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


-Patient is on IV Lasix, strict input and output, patient is on dobutamine gtt.


- Stress test with markedly dilated LV with severely decreased EF of 10%. 

Multiple fixed perfusion defects with minimal reversibility in the inferior wall


 suggestive of dilated nonischemic cardiomyopathy.


- Cardiology consult appreciated


Bilateral leg chronic wound with poor healing


- Wound care consult, DRESSING NOTED


Atypical chest pain-Resolved


- significantly dilated LV with noted severely decreased EF. will need a life 

vest. Likely secondary to CAD, will monitor. Now resolved, Satin THERAPY


 KASSIE


   


- Likely secondary to Vasomotor nephropathy from diuresis. Trend BUN and CR. 

Hold ACEI


 Hyperkalemia


- Resolved, anticipate decreased with diuresis. Trend


Hypotension


- Likely due to doubtamin, continue to monitor. patient is asymptomatic


Non Ischemic cardiomyopathy


- cardiology following


DJD


stable


complete LBBB


   Cardiology following


 Abnormal Tasha


   Elevate Troponin like secondary to cardiomyopathy, tending down, non specific


DVT PPx with Heparin and GI with pepcid


Plan discussed with patient in detail .


Plan discussed with cardiology and also with the patient. 


possible D/C in 1-2 days.





History


Interval history: 


Patient was seen and evaluated this morning, patient said he has dyspnea on 

walking short distance.








Hospitalist Physical





- Physical exam


Narrative exam: 


 Not in cardiopulmonary distress. 


 The patient appeared well nourished and normally developed.


 Vital signs as documented.


 Head exam is unremarkable.


 No scleral icterus .


 Neck is without jugular venous distension, thyromegaly, or carotid bruits. 


 Lungs are clear to auscultation.


Cardiac exam reveals regular rate and  Rhythm. First and second heart sounds 

normal. No murmurs, rubs or gallops. 


Abdominal exam reveals normal bowel sounds, no masses, no organomegaly and no 

aortic enlargement. 


Extremities are nonedematous and both femoral and pedal pulses are normal.


CNS: Alert and oriented 3.  No focal weakness.








- Constitutional


Vitals: 


 











Temp Pulse Resp BP Pulse Ox


 


 98.8 F   97 H  18   90/59   98 


 


 12/17/17 04:30  12/17/17 06:28  12/17/17 04:30  12/17/17 04:30  12/17/17 10:00











General appearance: Present: mild distress





Results





- Labs


CBC & Chem 7: 


 12/13/17 04:00





 12/17/17 06:16


Labs: 


 Laboratory Last Values











WBC  5.2 K/mm3 (4.5-11.0)   12/13/17  04:00    


 


RBC  4.25 M/mm3 (3.65-5.03)   12/13/17  04:00    


 


Hgb  13.7 gm/dl (11.8-15.2)   12/13/17  04:00    


 


Hct  42.5 % (35.5-45.6)   12/13/17  04:00    


 


MCV  100 fl (84-94)  H  12/13/17  04:00    


 


MCH  32 pg (28-32)   12/13/17  04:00    


 


MCHC  32 % (32-34)   12/13/17  04:00    


 


RDW  17.5 % (13.2-15.2)  H  12/13/17  04:00    


 


Plt Count  127 K/mm3 (140-440)  L  12/13/17  04:00    


 


Lymph % (Auto)  10.4 % (13.4-35.0)  L  12/10/17  12:21    


 


Mono % (Auto)  8.4 % (0.0-7.3)  H  12/10/17  12:21    


 


Eos % (Auto)  0.1 % (0.0-4.3)   12/10/17  12:21    


 


Baso % (Auto)  0.7 % (0.0-1.8)   12/10/17  12:21    


 


Lymph #  0.9 K/mm3 (1.2-5.4)  L  12/10/17  12:21    


 


Mono #  0.7 K/mm3 (0.0-0.8)   12/10/17  12:21    


 


Eos #  0.0 K/mm3 (0.0-0.4)   12/10/17  12:21    


 


Baso #  0.1 K/mm3 (0.0-0.1)   12/10/17  12:21    


 


Seg Neutrophils %  80.4 % (40.0-70.0)  H  12/10/17  12:21    


 


Seg Neutrophils #  7.1 K/mm3 (1.8-7.7)   12/10/17  12:21    


 


PT  16.6 Sec. (12.2-14.9)  H  12/11/17  04:54    


 


INR  1.27  (0.87-1.13)  H  12/11/17  04:54    


 


APTT  33.8 Sec. (24.2-36.6)   12/11/17  04:54    


 


Heparin Anti-Xa Level  < 0.10 U.I./ml (0.3-0.7)  L  12/13/17  06:00    


 


Sodium  139 mmol/L (137-145)   12/17/17  06:16    


 


Potassium  4.7 mmol/L (3.6-5.0)   12/17/17  06:16    


 


Chloride  98.6 mmol/L ()   12/17/17  06:16    


 


Carbon Dioxide  33 mmol/L (22-30)  H  12/17/17  06:16    


 


Anion Gap  12 mmol/L  12/17/17  06:16    


 


BUN  15 mg/dL (9-20)   12/17/17  06:16    


 


Creatinine  1.0 mg/dL (0.8-1.5)   12/17/17  06:16    


 


Estimated GFR  > 60 ml/min  12/17/17  06:16    


 


BUN/Creatinine Ratio  15 %  12/17/17  06:16    


 


Glucose  83 mg/dL ()   12/17/17  06:16    


 


POC Glucose  96  ()   12/12/17  21:31    


 


Calcium  8.5 mg/dL (8.4-10.2)   12/17/17  06:16    


 


Magnesium  2.10 mg/dL (1.7-2.3)   12/10/17  12:21    


 


Total Bilirubin  1.70 mg/dL (0.1-1.2)  H  12/10/17  12:21    


 


Direct Bilirubin  0.4 mg/dL (0-0.2)  H  12/10/17  12:21    


 


Indirect Bilirubin  1.3 mg/dL  12/10/17  12:21    


 


AST  27 units/L (5-40)   12/10/17  12:21    


 


ALT  24 units/L (7-56)   12/10/17  12:21    


 


Alkaline Phosphatase  64 units/L ()   12/10/17  12:21    


 


Total Creatine Kinase  174 units/L ()  H  12/11/17  04:54    


 


CK-MB (CK-2)  7.9 ng/mL (0.0-4.0)  H  12/11/17  04:54    


 


CK-MB (CK-2) Rel Index  4.5  (0-4)  H  12/11/17  04:54    


 


Troponin T  0.028 ng/mL (0.00-0.029)   12/11/17  04:54    


 


NT-Pro-B Natriuret Pep  15002 pg/mL (0-900)  H  12/10/17  12:21    


 


Total Protein  7.0 g/dL (6.3-8.2)   12/10/17  12:21    


 


Albumin  3.1 g/dL (3.9-5)  L  12/10/17  12:21    


 


Albumin/Globulin Ratio  0.8 %  12/10/17  12:21    


 


Triglycerides  91 mg/dL (2-149)   12/10/17  12:21    


 


Cholesterol  154 mg/dL ()   12/10/17  12:21    


 


LDL Cholesterol Direct  87 mg/dL ()   12/10/17  12:21    


 


HDL Cholesterol  49 mg/dL (40-59)   12/10/17  12:21    


 


Cholesterol/HDL Ratio  3.14 %  12/10/17  12:21    


 


Urine Color  Straw  (Yellow)   12/11/17  00:49    


 


Urine Turbidity  Clear  (Clear)   12/11/17  00:49    


 


Urine pH  5.0  (5.0-7.0)   12/11/17  00:49    


 


Ur Specific Gravity  1.005  (1.003-1.030)   12/11/17  00:49    


 


Urine Protein  <15 mg/dl mg/dL (Negative)   12/11/17  00:49    


 


Urine Glucose (UA)  Neg mg/dL (Negative)   12/11/17  00:49    


 


Urine Ketones  Neg mg/dL (Negative)   12/11/17  00:49    


 


Urine Blood  Neg  (Negative)   12/11/17  00:49    


 


Urine Nitrite  Neg  (Negative)   12/11/17  00:49    


 


Urine Bilirubin  Neg  (Negative)   12/11/17  00:49    


 


Urine Urobilinogen  < 2.0 mg/dL (<2.0)   12/11/17  00:49    


 


Ur Leukocyte Esterase  Neg  (Negative)   12/11/17  00:49    


 


Urine WBC (Auto)  1.0 /HPF (0.0-6.0)   12/11/17  00:49    


 


Urine RBC (Auto)  < 1.0 /HPF (0.0-6.0)   12/11/17  00:49    


 


U Epithel Cells (Auto)  < 1.0 /HPF (0-13.0)   12/11/17  00:49    


 


Urine Mucus  Few /HPF  12/11/17  00:49    


 


Blood Type  A POSITIVE   12/10/17  12:21    


 


Antibody Screen  Negative   12/10/17  12:21

## 2017-12-17 NOTE — PROGRESS NOTE
Assessment and Plan





Assessment:


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


Recurrent chest pains - currently resolved


Mild increase in troponins - trending down; currenly nonspecific in setting of 

acutely decompensated heart failure and ? CKD


CMP - presumably nonischemic as stress test was negative for ischemia


H/o HTN - currently with borderline hypotension 


DJD


Mild hyperkalemia - improved


Thrombocytopenia 


Possible CKD 


Complete LBBB


NSVT





Plan:


Cont dobutamine gtt @ 5mcg/kg/min. Cont IV lasix. 


LifeVest in place. 


patient continues to be SOB,B.P being on low side,continur dibutamine,


no evidence of thrombus on venous flow studies.


No significant change in patient's status.





Subjective


Date of service: 12/17/17


Principal diagnosis: chest pain


Interval history: 





Still SOB,has leg swelling,says still SOB,legs hurt..





Objective


 Vital Signs











  Temp Pulse Pulse Resp BP BP Pulse Ox


 


 12/17/17 10:00        98


 


 12/17/17 06:28   97 H     


 


 12/17/17 04:30  98.8 F  100 H   18  90/59   100


 


 12/17/17 00:06  99.1 F  103 H   20  102/72   100


 


 12/16/17 22:00    96 H    


 


 12/16/17 21:18        98


 


 12/16/17 20:08  99.4 F  103 H   20  94/69   99


 


 12/16/17 17:14  98.5 F  70   20   96/58  99


 


 12/16/17 16:17  98.5 F  99 H   20  96/58   93


 


 12/16/17 12:52  98.9 F  100 H   20   89/61 














- Physical Examination


General: No Apparent Distress


HEENT: Positive: PERRL, Normocephaly, Sinus Tenderness


Neck: Positive: neck supple, trachea midline, JVD/HJR (markedly elevated.)


Cardiac: Positive: Reg Rate and Rhythm


Lungs: Positive: Decreased Breath Sounds


Neuro: Positive: Grossly Intact


Abdomen: Positive: Soft.  Negative: Tender


Skin: Positive: Clear.  Negative: Rash, Wound


Extremities: Present: +2 Edema (BLE)





- Labs and Meds


 Comprehensive Metabolic Panel











  12/17/17 Range/Units





  06:16 


 


Sodium  139  (137-145)  mmol/L


 


Potassium  4.7  (3.6-5.0)  mmol/L


 


Chloride  98.6  ()  mmol/L


 


Carbon Dioxide  33 H  (22-30)  mmol/L


 


BUN  15  (9-20)  mg/dL


 


Creatinine  1.0  (0.8-1.5)  mg/dL


 


Glucose  83  ()  mg/dL


 


Calcium  8.5  (8.4-10.2)  mg/dL

## 2017-12-18 VITALS — SYSTOLIC BLOOD PRESSURE: 89 MMHG | DIASTOLIC BLOOD PRESSURE: 59 MMHG

## 2017-12-18 LAB
ANION GAP SERPL CALC-SCNC: 15 MMOL/L
BUN SERPL-MCNC: 15 MG/DL (ref 9–20)
BUN/CREAT SERPL: 14 %
CALCIUM SERPL-MCNC: 8.5 MG/DL (ref 8.4–10.2)
CHLORIDE SERPL-SCNC: 95.7 MMOL/L (ref 98–107)
CO2 SERPL-SCNC: 32 MMOL/L (ref 22–30)
GLUCOSE SERPL-MCNC: 93 MG/DL (ref 75–100)
POTASSIUM SERPL-SCNC: 4.5 MMOL/L (ref 3.6–5)
SODIUM SERPL-SCNC: 138 MMOL/L (ref 137–145)

## 2017-12-18 RX ADMIN — FUROSEMIDE SCH MG: 10 INJECTION, SOLUTION INTRAVENOUS at 06:08

## 2017-12-18 RX ADMIN — NITROGLYCERIN SCH: 20 OINTMENT TOPICAL at 09:00

## 2017-12-18 RX ADMIN — FUROSEMIDE SCH: 10 INJECTION, SOLUTION INTRAVENOUS at 18:50

## 2017-12-18 RX ADMIN — HEPARIN SODIUM SCH UNIT: 5000 INJECTION, SOLUTION INTRAVENOUS; SUBCUTANEOUS at 14:34

## 2017-12-18 RX ADMIN — DOBUTAMINE IN DEXTROSE SCH MLS/HR: 200 INJECTION, SOLUTION INTRAVENOUS at 18:35

## 2017-12-18 RX ADMIN — NITROGLYCERIN SCH: 20 OINTMENT TOPICAL at 06:08

## 2017-12-18 RX ADMIN — HEPARIN SODIUM SCH UNIT: 5000 INJECTION, SOLUTION INTRAVENOUS; SUBCUTANEOUS at 06:08

## 2017-12-18 RX ADMIN — NITROGLYCERIN SCH: 20 OINTMENT TOPICAL at 13:10

## 2017-12-18 RX ADMIN — NITROGLYCERIN SCH: 20 OINTMENT TOPICAL at 17:00

## 2017-12-18 RX ADMIN — ASPIRIN SCH MG: 81 TABLET, CHEWABLE ORAL at 10:55

## 2017-12-18 NOTE — VASCULAR LAB REPORT
LOWER EXTREMITY VENOUS DUPLEX:



REASON FOR EXAM: Pain and swelling of bilateral lower extremities.



COMMENTS ON THE RIGHT:



All veins visualized are freely compressible without evidence of

internal echogenicity.  Flow is spontaneous and phasic throughout.



COMMENTS ON THE LEFT:



All veins visualized are freely compressible without evidence of

internal echogenicity.  Flow is spontaneous and phasic throughout.



IMPRESSION:



No evidence of acute or chronic deep venous thrombosis in either

lower extremity.

## 2017-12-18 NOTE — DISCHARGE SUMMARY
Providers





- Providers


Date of Admission: 


12/10/17 22:28





Date of discharge: 12/18/17


Attending physician: 


NGOC PATEL MD





 





12/10/17 21:54


Consult to Physician [CONS] Routine 


   Consulting Provider: JENY LARSON


   Reason For Exam: chf


   Place consult to:: yes


   Notified:: y


   Was contact made?: Yes





12/15/17 10:40


Consult to Wound/ET Nurse [CONS] Routine 


   Reason For Exam: wound eval





12/18/17 10:56


Physical Therapy Evaluation and Treat [CONS] Routine 


   Comment: 


   Reason For Exam: lower ext weakness


   Mode of Transport?: Walker


   Weight bearing status?: Full wt bearing


   Assistive devices?: No: none











Primary care physician: 


PRIMARY CARE MD








Hospitalization


Reason for admission: Acute on chronic combined CHF


Condition: Poor


Pertinent studies: 





Chest x-ray; suggestive of CHF.


Echo


- Left ventricular chamber size is severely dilated with ejection fraction of 

510%.  Abnormal left ventricular diastolic function is observed.  The left 

atrium is moderately dilated.  Right ventricular global systolic function is 

moderately reduced.  severe MR.


Exercise stress test


- Negative for ischemia


Hospital course: 


77-year-old pleasant -American gentleman with history of long-standing 

hypertension, degenerative joint disease, was admitted with progressive 

shortness of breath for 1 month duration.  Patient also complains moderate to 

severe substernal pressure like chest pain, on and off, worsened on exertion, 

radiating to both sides of the neck.  Patient has associated bilateral leg 

swelling for the past 1 month.  Patient denied nausea, vomiting, palpitation.  

Patient didn't see any physician for long time.  Patient denied any history of 

CAD, diabetes, hyperlipidemia.


  Patient was admitted to the floor for the management of acute combined 

systolic and diastolic biventricular heart failure and started management with 

IV Lasix, dobutamine, BP meds and appropriate CHF medications.  Cardiology 

consult appreciated.  Patient was on LifeVest.  Patient showed very little 

apparent clinical improvement.  Cardiology arranged transfer to tertiary care 

facility, and patient transferred to [facility] failure service accepted by 

Dr. Mckeon.  Patient had complete LBBB, thrombocytopenia, nonsustained V. tach

, persistent hypotension since admission, CKD.  Patient was stable for transfer.


Patient was evaluated by vascular surgery for his chronic ulcer and recommended 

no intervention at this time.





[facility]


Time spent for discharge: 31 minutes





- Discharge Diagnoses


(1) Acute on chronic diastolic CHF (congestive heart failure), NYHA class 4


Status: Acute   





(2) Bilateral lower extremity edema


Status: Acute   





(3) Chest pain


Status: Acute   





(4) Shortness of breath


Status: Acute   





Core Measure Documentation





- Palliative Care


Palliative Care/ Comfort Measures: Not Applicable





- Core Measures


Any of the following diagnoses?: heart failure





- Heart Failure Discharge Requirements


ACE/ARB for LVSD if EF <40%: No


Reason for no ACE/ARB: Hypotension


Beta blocker at discharge: No


Reason for no beta blocker on DC: Hypotension





Exam





- Physical Exam


Narrative exam: 


 Not in cardiopulmonary distress. 


 The patient appeared well nourished and normally developed.


 Vital signs as documented.


 Head exam is unremarkable.


 No scleral icterus .


 Neck is without jugular venous distension, thyromegaly, or carotid bruits. 


 Lungs are clear to auscultation.


Cardiac exam reveals regular rate and  Rhythm. First and second heart sounds 

normal. No murmurs, rubs or gallops. 


Abdominal exam reveals normal bowel sounds, no masses, no organomegaly and no 

aortic enlargement. 


Extremities bilateral lower extremity edema, chronic ulcer on the RLE.


CNS: Alert and oriented 3.  No focal weakness.








- Constitutional


Vitals: 


 











Temp Pulse Resp BP Pulse Ox


 


 98.6 F   101 H  20   82/60   100 


 


 12/18/17 12:23  12/18/17 12:23  12/18/17 12:23  12/18/17 12:23  12/18/17 12:23














Plan


Activity: no restrictions


Weight Bearing Status: Full Weight Bearing


Diet: low cholesterol, low salt


Follow up with: 


[facility] [Provider Group] - 7 Days


JENY LARSON MD [Staff Physician] - 7 Days


PRIMARY CARE,MD [Primary Care Provider] - 3-5 Days

## 2017-12-18 NOTE — PROGRESS NOTE
Assessment and Plan


Assessment:


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


CMP - EF 5-10%; presumably nonischemic as stress test was negative for ischemia


Recurrent chest pains - currently resolved


Mild increase in troponins - trending down; currenly nonspecific in setting of 

acutely decompensated heart failure and ? CKD


H/o HTN - with persistent hypotension since admission 


DJD


Mild hyperkalemia - improved


Thrombocytopenia 


Possible CKD 


Complete LBBB


NSVT





Plan:


Cont dobutamine gtt @ 5mcg/kg/min. Cont IV lasix. 


LifeVest in place. 


Pt with very little apparent clinical improvement. Transfer to tertiary care 

facility for further eval/management recommended.  D/w Dr. Mckeon with [facility] 

heart failure service who has agreed to accept the patient at [facility]. Transfer 

to be arranged per [facility] transfer service. D/w Dr. Rothman. 





The patient has been seen in conjunction with Dr. LAURA Moreau who agrees with the 

assessment and plan of care.








Subjective


Date of service: 12/18/17


Principal diagnosis: chest pain


Interval history: 


pt resting comfortably, denies cp or SOB. BLE edema persists. LifeVest in 

place. Dobutamine gtt infusing. BPs remain low. 








Objective





 Last Vital Signs











Temp  98.6 F   12/18/17 12:23


 


Pulse  101 H  12/18/17 12:23


 


Resp  20   12/18/17 12:23


 


BP  82/60   12/18/17 12:23


 


Pulse Ox  100   12/18/17 12:23








 








- Physical Examination


General: No Apparent Distress


HEENT: Positive: PERRL, Normocephaly, Sinus Tenderness


Neck: Positive: neck supple, trachea midline, JVD/HJR (markedly elevated.)


Cardiac: Positive: Reg Rate and Rhythm, S1/S2


Lungs: Positive: Decreased Breath Sounds


Neuro: Positive: Grossly Intact


Abdomen: Positive: Soft.  Negative: Tender


Skin: Positive: Clear.  Negative: Rash, Wound


Extremities: Present: +2 Edema (BLE)





- Labs and Meds


 Comprehensive Metabolic Panel











  12/18/17 Range/Units





  06:26 


 


Sodium  138  (137-145)  mmol/L


 


Potassium  4.5  (3.6-5.0)  mmol/L


 


Chloride  95.7 L  ()  mmol/L


 


Carbon Dioxide  32 H  (22-30)  mmol/L


 


BUN  15  (9-20)  mg/dL


 


Creatinine  1.1  (0.8-1.5)  mg/dL


 


Glucose  93  ()  mg/dL


 


Calcium  8.5  (8.4-10.2)  mg/dL














- Telemetry


EKG Rhythm: Sinus Rhythm

## 2018-03-30 ENCOUNTER — HOSPITAL ENCOUNTER (OUTPATIENT)
Dept: HOSPITAL 5 - VAS | Age: 78
Discharge: HOME | End: 2018-03-30
Attending: INTERNAL MEDICINE
Payer: MEDICARE

## 2018-03-30 DIAGNOSIS — R60.0: Primary | ICD-10-CM

## 2018-03-30 DIAGNOSIS — I50.9: ICD-10-CM

## 2018-03-30 DIAGNOSIS — Z87.891: ICD-10-CM

## 2018-03-30 DIAGNOSIS — I11.0: ICD-10-CM

## 2018-03-30 PROCEDURE — 93970 EXTREMITY STUDY: CPT

## 2018-04-04 NOTE — VASCULAR LAB REPORT
LOWER EXTREMITY VENOUS DUPLEX:



REASON FOR EXAM: Swelling for 3 months.



COMMENTS ON THE RIGHT:

All veins visualized are freely compressible without evidence of

internal echogenicity.  Flow is spontaneous and phasic throughout.



COMMENTS ON THE LEFT:

All veins visualized are freely compressible without evidence of

internal echogenicity.  Flow is spontaneous and phasic throughout.



IMPRESSION:

No evidence of acute or chronic deep venous thrombosis in either

lower extremity.